# Patient Record
Sex: FEMALE | Race: BLACK OR AFRICAN AMERICAN | NOT HISPANIC OR LATINO | Employment: UNEMPLOYED | URBAN - METROPOLITAN AREA
[De-identification: names, ages, dates, MRNs, and addresses within clinical notes are randomized per-mention and may not be internally consistent; named-entity substitution may affect disease eponyms.]

---

## 2020-07-28 ENCOUNTER — HISTORICAL (OUTPATIENT)
Dept: ADMINISTRATIVE | Facility: HOSPITAL | Age: 30
End: 2020-07-28

## 2020-07-28 LAB
ABS NEUT (OLG): 4.86 X10(3)/MCL (ref 2.1–9.2)
ALBUMIN SERPL-MCNC: 3.6 GM/DL (ref 3.5–5)
ALBUMIN/GLOB SERPL: 1.1 RATIO (ref 1.1–2)
ALP SERPL-CCNC: 125 UNIT/L (ref 40–150)
ALT SERPL-CCNC: 16 UNIT/L (ref 0–55)
AMYLASE SERPL-CCNC: 42 UNIT/L (ref 25–125)
AST SERPL-CCNC: 14 UNIT/L (ref 5–34)
BASOPHILS # BLD AUTO: 0.1 X10(3)/MCL (ref 0–0.2)
BASOPHILS NFR BLD AUTO: 1 %
BILIRUB SERPL-MCNC: 0.3 MG/DL
BILIRUBIN DIRECT+TOT PNL SERPL-MCNC: 0.1 MG/DL (ref 0–0.8)
BILIRUBIN DIRECT+TOT PNL SERPL-MCNC: 0.2 MG/DL (ref 0–0.5)
BUN SERPL-MCNC: 11.6 MG/DL (ref 7–18.7)
CALCIUM SERPL-MCNC: 9.5 MG/DL (ref 8.4–10.2)
CHLORIDE SERPL-SCNC: 106 MMOL/L (ref 98–107)
CO2 SERPL-SCNC: 26 MMOL/L (ref 22–29)
CREAT SERPL-MCNC: 0.81 MG/DL (ref 0.55–1.02)
EOSINOPHIL # BLD AUTO: 0.2 X10(3)/MCL (ref 0–0.9)
EOSINOPHIL NFR BLD AUTO: 2 %
ERYTHROCYTE [DISTWIDTH] IN BLOOD BY AUTOMATED COUNT: 12.5 % (ref 11.5–17)
GLOBULIN SER-MCNC: 3.2 GM/DL (ref 2.4–3.5)
GLUCOSE SERPL-MCNC: 147 MG/DL (ref 74–100)
HCT VFR BLD AUTO: 41 % (ref 37–47)
HGB BLD-MCNC: 12.9 GM/DL (ref 12–16)
HIV 1+2 AB+HIV1 P24 AG SERPL QL IA: NONREACTIVE
IMM GRANULOCYTES # BLD AUTO: 0 10*3/UL
IMM GRANULOCYTES NFR BLD AUTO: 0 %
LIPASE SERPL-CCNC: 21 U/L
LYMPHOCYTES # BLD AUTO: 2.8 X10(3)/MCL (ref 0.6–4.6)
LYMPHOCYTES NFR BLD AUTO: 32 %
MCH RBC QN AUTO: 27.3 PG (ref 27–31)
MCHC RBC AUTO-ENTMCNC: 31.5 GM/DL (ref 33–36)
MCV RBC AUTO: 86.7 FL (ref 80–94)
MONOCYTES # BLD AUTO: 0.7 X10(3)/MCL (ref 0.1–1.3)
MONOCYTES NFR BLD AUTO: 8 %
NEUTROPHILS # BLD AUTO: 4.86 X10(3)/MCL (ref 2.1–9.2)
NEUTROPHILS NFR BLD AUTO: 56 %
PLATELET # BLD AUTO: 151 X10(3)/MCL (ref 130–400)
PMV BLD AUTO: 11.9 FL (ref 9.4–12.4)
POTASSIUM SERPL-SCNC: 3.9 MMOL/L (ref 3.5–5.1)
PROT SERPL-MCNC: 6.8 GM/DL (ref 6.4–8.3)
RBC # BLD AUTO: 4.73 X10(6)/MCL (ref 4.2–5.4)
SODIUM SERPL-SCNC: 141 MMOL/L (ref 136–145)
TSH SERPL-ACNC: 0.62 UIU/ML (ref 0.35–4.94)
WBC # SPEC AUTO: 8.7 X10(3)/MCL (ref 4.5–11.5)

## 2020-09-16 ENCOUNTER — HISTORICAL (OUTPATIENT)
Dept: ENDOSCOPY | Facility: HOSPITAL | Age: 30
End: 2020-09-16

## 2021-08-25 ENCOUNTER — HISTORICAL (OUTPATIENT)
Dept: ENDOSCOPY | Facility: HOSPITAL | Age: 31
End: 2021-08-25

## 2022-01-27 ENCOUNTER — HISTORICAL (OUTPATIENT)
Dept: ENDOSCOPY | Facility: HOSPITAL | Age: 32
End: 2022-01-27

## 2022-01-27 LAB — CBG: 281 (ref 70–115)

## 2022-03-23 ENCOUNTER — HISTORICAL (OUTPATIENT)
Dept: ADMINISTRATIVE | Facility: HOSPITAL | Age: 32
End: 2022-03-23

## 2022-03-23 LAB — POC CREATININE: 0.7 (ref 0.6–1.3)

## 2022-05-04 NOTE — HISTORICAL [facility]
This is a historical note converted from [employer]. Formatting and pictures may have been removed.  Please reference [employer] for original formatting and attached multimedia. Chief Complaint  Here for outpatient EGD  History of Present Illness  30 year old  female here for outpatient EGD.? See prior clinic note for further details.  Review of Systems  Constitutional:?no fever, fatigue  Eye:?no vision loss, eye redness, drainage, or pain  ENMT:?no sore throat, ear pain, sinus pain/congestion, nasal congestion/drainage  Respiratory:?no cough, no shortness of breath  Cardiovascular:?no chest pain, no palpitations  Gastrointestinal:?no nausea, vomiting, or diarrhea. No abdominal pain  Genitourinary:?no dysuria, no urinary frequency  Hema/Lymph:?no abnormal bruising or bleeding  Endocrine:?no heat or cold intolerance  Musculoskeletal:?no muscle or joint pain, no joint swelling  Integumentary:?no skin rash  Neurologic: no headache  Physical Exam  Vitals & Measurements  WT:?131.54?kg? BMI:?49.79?  General:?obese female in no acute distress  Eye: EOMI, clear conjunctiva, eyelids normal  HENT:?TMs/ear canals clear, oropharynx without erythema/exudate  Neck: full range of motion  Respiratory:?clear to auscultation bilaterally  Cardiovascular:?regular rate and rhythm without murmurs  Gastrointestinal:?soft, non-tender, non-distended with normal bowel sounds  Integumentary: no rashes  Neurologic: cranial nerves intact, grossly  Assessment/Plan  Discussed the risks and benefits of the procedure in detail, including but not limited to bleeding, perforation, infection, missed cancer and rarely Death.? Patient understands and wishes to proceed.?   Problem List/Past Medical History  Ongoing  Afib  Depression (emotion)  Diabetes mellitus  DM (diabetes mellitus)  Gastroparesis  High blood pressure disorder  HTN (hypertension)  Neuropathy  Schizophrenia  Tachycardia  TIA  Historical  Asthma  Diabetes mellitus type 2 in  obese  Essential hypertension  Gastric reflux  Procedure/Surgical History  Colonoscopy (12/28/2015)  Colonoscopy, flexible; with biopsy, single or multiple (12/28/2015)  Esophagogastroduodenoscopy (12/28/2015)  Esophagogastroduodenoscopy, flexible, transoral; with biopsy, single or multiple (12/28/2015)  Excision of Rectum, Via Natural or Artificial Opening Endoscopic, Diagnostic (12/28/2015)  Excision of Small Intestine, Via Natural or Artificial Opening Endoscopic, Diagnostic (12/28/2015)  cholecystectomy (09/09/2014)  esophageal flap reconstruction  Tonsillectomy   Medications  Inpatient  Buffered Lidocaine 1% - 1mL syringe, 5 mg= 0.5 mL, ID, As Directed, PRN  Pepcid (for IVPB), 40 mg= 4 mL, IV Piggyback, Once-NOW  Sodium Chloride 0.9% intravenous solution 1,000 mL, 1000 mL, IV  Home  desvenlafaxine 50 mg oral tablet, extended release, 50 mg= 1 tab(s), Oral, Daily  ferrous sulfate 324 mg (65 mg elemental iron) oral delayed release tablet, 324 mg= 1 tab(s), Oral, Daily  LORazepam 1 mg oral tablet, 1 mg= 1 tab(s), Oral, BID  metoclopramide 10 mg oral tablet, 10 mg= 1 tab(s), Oral, TID  montelukast 10 mg oral TABLET, 10 mg= 1 tab(s), Oral, Daily  ReliOn/NovoLIN R 100 units/mL injectable solution, Subcutaneous  Toujeo SoloStar 300 units/mL subcutaneous solution  Zofran ODT 4 mg oral tablet, disintegrating, 4 mg= 1 tab(s), Oral, q8hr, PRN  Allergies  Dilaudid?(Injection site burning, C/O: itching)  Geodon?(withdraw)  Januvia  Peanuts  ciprofloxacin  hydrochlorothiazide  penicillins?(vaginal burning)  sulfamethoxazole  Social History  Abuse/Neglect  No, 09/16/2020  No, 09/15/2020  No, 12/26/2019  Alcohol - Denies Alcohol Use, 08/18/2014  Never, 07/21/2015  Employment/School  Unemployed, 08/20/2015  Unemployed, 02/28/2015  Exercise  Exercise frequency: 3-4 times/week. Self assessment: Poor condition. Exercise type: Walking., 08/20/2015  Home/Environment  Lives with Mother., 04/11/2016  Lives with Father. Living  situation: Home/Independent. Home equipment: Glucose monitoring, life alert. Alcohol abuse in household: No. Substance abuse in household: No. Smoker in household: Yes. Feels unsafe at home: No. Safe place to go: Yes. Family/Friends available for support: Yes., 08/20/2015  Lives with Father., 03/17/2015  Nutrition/Health  Diabetic, Wants to lose weight: Yes. Sleeping concerns: Yes., 08/20/2015  Substance Use - Denies Substance Abuse, 08/18/2014  Never, 07/21/2015  Tobacco - Denies Tobacco Use, 08/18/2014  Never (less than 100 in lifetime), N/A, 09/16/2020  Never (less than 100 in lifetime), N/A, 09/15/2020  Never (less than 100 in lifetime), N/A, 12/26/2019  Never smoker, N/A, 12/18/2018  Family History  ANEMIA: Mother.  Glaucoma.: Father.  Hypertension.: Mother and Father.

## 2022-05-04 NOTE — HISTORICAL [facility]
This is a historical note converted from [employer]. Formatting and pictures may have been removed.  Please reference [employer] for original formatting and attached multimedia. Chief Complaint  here for outpatient colonoscopy  History of Present Illness  31 year old female here for outpatient colonoscopy given history of previous colon polyps.  Review of Systems  Constitutional:?no fever, fatigue  Eye:?no vision loss, eye redness, drainage, or pain  ENMT:?no sore throat, ear pain, sinus pain/congestion, nasal congestion/drainage  Respiratory:?no cough, no shortness of breath  Cardiovascular:?no chest pain, no palpitations  Gastrointestinal:?no nausea, vomiting, or diarrhea. No abdominal pain  Genitourinary:?no dysuria, no urinary frequency  Hema/Lymph:?no abnormal bruising or bleeding  Endocrine:?no heat or cold intolerance  Musculoskeletal:?no muscle or joint pain, no joint swelling  Integumentary:?no skin rash  Neurologic: no headache  Physical Exam  Vitals & Measurements  WT:?139.5?kg? BMI:?52.83?  General:?well-developed well-nourished in no acute distress  Eye: EOMI, clear conjunctiva, eyelids normal  HENT:?TMs/ear canals clear, oropharynx without erythema/exudate  Neck: full range of motion  Respiratory:?clear to auscultation bilaterally  Cardiovascular:?regular rate and rhythm without murmurs  Gastrointestinal:?soft, non-tender, non-distended with normal bowel sounds  Integumentary: no rashes  Neurologic: cranial nerves intact, grossly  Assessment/Plan  Discussed the risks and benefits of the procedure in detail, including but not limited to bleeding, perforation, infection, missed polyps and cancer and rarely Death.? Patient understands and wishes to proceed.?   Problem List/Past Medical History  Ongoing  Afib  Asthma  Depression (emotion)  Diabetes mellitus  Diabetes mellitus type 2 in obese  DM (diabetes mellitus)  Essential hypertension  Gastroparesis  High blood pressure disorder  HTN  (hypertension)  Neuropathy  Schizophrenia  Tachycardia  TIA  Historical  Gastric reflux  Procedure/Surgical History  Biopsy Gastrointestinal (09/16/2020)  Esophagogastroduodenoscopy (09/16/2020)  Esophagogastroduodenoscopy, flexible, transoral; with biopsy, single or multiple (09/16/2020)  Excision of Stomach, Via Natural or Artificial Opening Endoscopic, Diagnostic (09/16/2020)  Colonoscopy (12/28/2015)  Colonoscopy, flexible; with biopsy, single or multiple (12/28/2015)  Esophagogastroduodenoscopy (12/28/2015)  Esophagogastroduodenoscopy, flexible, transoral; with biopsy, single or multiple (12/28/2015)  Excision of Rectum, Via Natural or Artificial Opening Endoscopic, Diagnostic (12/28/2015)  Excision of Small Intestine, Via Natural or Artificial Opening Endoscopic, Diagnostic (12/28/2015)  cholecystectomy (09/09/2014)  esophageal flap reconstruction  Tonsillectomy   Medications  Inpatient  Buffered Lidocaine 1% - 1mL syringe, 5 mg= 0.5 mL, ID, As Directed, PRN  Buffered Lidocaine 1% - 1mL syringe, 5 mg= 0.5 mL, ID, As Directed, PRN  midazolam, 2 mg= 2 mL, IV Push, q5min  Pepcid (for IVPB), 40 mg= 4 mL, IV Piggyback, Once-NOW  Plasmalyte 1,000 mL, 1000 mL, IV  prednisONE 20 mg oral tablet, 40 mg= 4 tab(s), Oral, Once  Sodium Chloride 0.9% intravenous solution 500 mL, 500 mL, IV  Home  aspirin 81 mg oral Delayed Release (EC) tablet, 81 mg= 1 tab(s), Oral, Daily  atorvastatin 10 mg oral tablet, 10 mg= 1 tab(s), Oral, Daily  Breo Ellipta 200 mcg-25 mcg/inh inhalation powder, 1 puff(s), Oral, Daily  bumetanide 2 mg oral tablet, 2 mg= 1 tab(s), Oral, Daily  cholecalciferol 50,000 intl units (1250 mcg) oral capsule, 32374 IntUnit= 1 cap(s), Oral, qWeek  desvenlafaxine 100 mg oral tablet, extended release, 100 mg= 1 tab(s), Oral, Daily  desvenlafaxine 50 mg oral tablet, extended release, 50 mg= 1 tab(s), Oral, Daily,? ?Not taking  DIAZepam 5 mg oral tablet, 5 mg= 1 tab(s), Oral, TID  famotidine 40 mg oral tablet, 40 mg=  1 tab(s), Oral, qPM  ferrous sulfate 324 mg (65 mg elemental iron) oral delayed release tablet, 324 mg= 1 tab(s), Oral, Daily  fluticasone 50 mcg/inh nasal spray, 2 spray(s), Both Nostrils, Daily  glipiZIDE 10 mg oral tablet, extended release (XL tab), 10 mg= 1 tab(s), Oral, Daily  ibuprofen 800 mg oral tablet, 800 mg= 1 tab(s), Oral, q6hr,? ?Not taking  isosorbide MONOnitrate 10 mg oral tablet (Immed. Release), 10 mg= 1 tab(s), Oral, BID  levalbuterol 0.63 mg/3 mL inhalation solution, 0.63 mg= 3 mL, Oral, TID  loratadine 10 mg oral tablet, 10 mg= 1 tab(s), Oral, Daily  LORazepam 1 mg oral tablet, 1 mg= 1 tab(s), Oral, BID,? ?Not taking  LORazepam 2 mg oral tablet, 2 mg= 1 tab(s), Oral, TID,? ?Not taking  medroxyPROGESTERone 10 mg oral tablet, 10 mg= 1 tab(s), Oral, TID  metoclopramide 10 mg oral tablet, 10 mg= 1 tab(s), Oral, TID,? ?Not taking  metoprolol succinate 25 mg oral tablet, extended release, 25 mg= 1 tab(s), Oral, QID  metoprolol tartrate 25 mg oral tab, 12.5 mg= 0.5 tab(s), Oral, Daily,? ?Not taking  montelukast 10 mg oral TABLET, 10 mg= 1 tab(s), Oral, Daily  norethindrone 0.35 mg oral tablet, 0.35 mg= 1 tab(s), Oral, Daily,? ?Not taking  norethindrone 5 mg oral tablet, 5 mg= 1 tab(s), Oral, BID  NovoLOG FlexPen 100 units/mL injectable solution, 110 units= 110 units, Subcutaneous, Daily  Nurtec ODT 75 mg oral tablet, disintegrating, 75 mg= 1 tab(s), Oral, Daily  omeprazole 40 mg oral DR capsule, 40 mg= 1 cap(s), Oral, Daily,? ?Not taking  Pantoprazole 40 mg ORAL EC-Tablet, 40 mg= 1 tab(s), Oral, Daily  potassium chloride 8 mEq oral TABLET extended release, 8 mEq= 1 tab(s), Oral, Daily  pregabalin 50 mg oral capsule, 50 mg= 1 cap(s), Oral, BID  ReliOn/NovoLIN R 100 units/mL injectable solution, Subcutaneous  Rybelsus 7 mg oral tablet, 7 mg= 1 tab(s), Oral, Daily  Toujeo SoloStar 300 units/mL subcutaneous solution  traMADol 50 mg oral tablet, 50 mg= 1 tab(s), Oral, q12hr,? ?Not taking  Zofran ODT 4 mg  oral tablet, disintegrating, 4 mg= 1 tab(s), Oral, q8hr, PRN,? ?Not taking  Allergies  Dilaudid?(Injection site burning, C/O: itching)  Geodon?(withdraw)  Januvia  Peanuts  ciprofloxacin  hydrochlorothiazide  penicillins?(vaginal burning)  sulfamethoxazole  Social History  Abuse/Neglect  No, No, Yes, 08/25/2021  No, 04/25/2021  No, 09/16/2020  No, 09/15/2020  No, 12/26/2019  Alcohol - Denies Alcohol Use, 08/18/2014  Never, 07/21/2015  Employment/School  Unemployed, 08/20/2015  Unemployed, 02/28/2015  Exercise  Exercise frequency: 3-4 times/week. Self assessment: Poor condition. Exercise type: Walking., 08/20/2015  Home/Environment  Lives with Mother., 04/11/2016  Lives with Father. Living situation: Home/Independent. Home equipment: Glucose monitoring, life alert. Alcohol abuse in household: No. Substance abuse in household: No. Smoker in household: Yes. Feels unsafe at home: No. Safe place to go: Yes. Family/Friends available for support: Yes., 08/20/2015  Lives with Father., 03/17/2015  Nutrition/Health  Diabetic, Wants to lose weight: Yes. Sleeping concerns: Yes., 08/20/2015  Substance Use - Denies Substance Abuse, 08/18/2014  Never, 07/21/2015  Tobacco - Denies Tobacco Use, 08/18/2014  Never (less than 100 in lifetime), No, 08/25/2021  Never (less than 100 in lifetime), N/A, 04/25/2021  Never (less than 100 in lifetime), N/A, 09/16/2020  Never (less than 100 in lifetime), N/A, 09/15/2020  Never (less than 100 in lifetime), N/A, 12/26/2019  Never smoker, N/A, 12/18/2018  Family History  ANEMIA: Mother.  Glaucoma.: Father.  Hypertension.: Mother and Father.

## 2022-05-21 NOTE — HISTORICAL [facility]
This is a historical note converted from [employer]. Formatting and pictures may have been removed.  Please reference [employer] for original formatting and attached multimedia. Chief Complaint  Here for outpatient EGD/COLONOSCOPY  History of Present Illness  31 year old  female here for EGD/colonoscopy, see most recent clinic note for full details.  Review of Systems  Constitutional:?no fever, fatigue  Eye:?no vision loss  ENMT:?no sore throat  Respiratory:?no cough, no shortness of breath  Cardiovascular:?no chest pain, no palpitations  Gastrointestinal:?+ epigastric pain  Genitourinary:?no dysuria, no urinary frequency  Hema/Lymph:?no abnormal bruising or bleeding  Endocrine:?no heat or cold intolerance  Musculoskeletal:?no muscle or joint pain, no joint swelling  Integumentary:?no skin rash  Neurologic: no headache  Physical Exam  Vitals & Measurements  T:?36.9? ?C (Temporal Artery)? HR:?109(Monitored)? RR:?27? BP:?116/75? SpO2:?100%? WT:?138.79?kg? BMI:?52.52?  General:?well-developed well-nourished in no acute distress  Eye: EOMI, clear conjunctiva, eyelids normal  HENT:?TMs/ear canals clear, oropharynx without erythema/exudate  Neck: full range of motion  Respiratory:?clear to auscultation bilaterally  Cardiovascular:?regular rate and rhythm without murmurs  Gastrointestinal:?soft, non-tender, non-distended with normal bowel sounds  Integumentary: no rashes  Neurologic: cranial nerves intact, grossly  Assessment/Plan  Discussed the risks and benefits of the procedure in detail, including but not limited to bleeding, perforation, infection, missed polyps and cancer and rarely Death.? Patient understands and wishes to proceed.?   Problem List/Past Medical History  Ongoing  Afib  Asthma  Depression (emotion)  Diabetes mellitus  Diabetes mellitus type 2 in obese  DM (diabetes mellitus)  Essential hypertension  Gastroparesis  High blood pressure disorder  HTN  (hypertension)  Neuropathy  Schizophrenia  Tachycardia  TIA  Historical  Gastric reflux  Procedure/Surgical History  Colonoscopy (None) (08/25/2021)  Colonoscopy, flexible; diagnostic, including collection of specimen(s) by brushing or washing, when performed (separate procedure) (08/25/2021)  Inspection of Lower Intestinal Tract, Via Natural or Artificial Opening Endoscopic (08/25/2021)  Biopsy Gastrointestinal (09/16/2020)  Esophagogastroduodenoscopy (09/16/2020)  Esophagogastroduodenoscopy, flexible, transoral; with biopsy, single or multiple (09/16/2020)  Excision of Stomach, Via Natural or Artificial Opening Endoscopic, Diagnostic (09/16/2020)  Colonoscopy (12/28/2015)  Colonoscopy, flexible; with biopsy, single or multiple (12/28/2015)  Esophagogastroduodenoscopy (12/28/2015)  Esophagogastroduodenoscopy, flexible, transoral; with biopsy, single or multiple (12/28/2015)  Excision of Rectum, Via Natural or Artificial Opening Endoscopic, Diagnostic (12/28/2015)  Excision of Small Intestine, Via Natural or Artificial Opening Endoscopic, Diagnostic (12/28/2015)  cholecystectomy (09/09/2014)  esophageal flap reconstruction  Tonsillectomy   Medications  Inpatient  Buffered Lidocaine 1% - 1mL syringe, 5 mg= 0.5 mL, ID, As Directed, PRN  Pepcid (for IVPB), 40 mg= 4 mL, IV Piggyback, Once-NOW  Plasmalyte 1,000 mL, 1000 mL, IV  prednisONE 20 mg oral tablet, 40 mg= 4 tab(s), Oral, Once  Home  aspirin 81 mg oral Delayed Release (EC) tablet, 81 mg= 1 tab(s), Oral, Daily  atorvastatin 10 mg oral tablet, 10 mg= 1 tab(s), Oral, Daily  Breo Ellipta 200 mcg-25 mcg/inh inhalation powder, 1 puff(s), Oral, Daily  bumetanide 2 mg oral tablet, 2 mg= 1 tab(s), Oral, Daily  cholecalciferol 50,000 intl units (1250 mcg) oral capsule, 73654 IntUnit= 1 cap(s), Oral, qWeek  desvenlafaxine 100 mg oral tablet, extended release, 100 mg= 1 tab(s), Oral, Daily  desvenlafaxine 50 mg oral tablet, extended release, 50 mg= 1 tab(s), Oral, Daily,?  ?Not taking  DIAZepam 5 mg oral tablet, 5 mg= 1 tab(s), Oral, TID  famotidine 40 mg oral tablet, 40 mg= 1 tab(s), Oral, qPM  ferrous sulfate 324 mg (65 mg elemental iron) oral delayed release tablet, 324 mg= 1 tab(s), Oral, Daily  fluticasone 50 mcg/inh nasal spray, 2 spray(s), Both Nostrils, Daily  glipiZIDE 10 mg oral tablet, extended release (XL tab), 10 mg= 1 tab(s), Oral, Daily  ibuprofen 800 mg oral tablet, 800 mg= 1 tab(s), Oral, q6hr,? ?Not taking  isosorbide MONOnitrate 10 mg oral tablet (Immed. Release), 10 mg= 1 tab(s), Oral, BID  levalbuterol 0.63 mg/3 mL inhalation solution, 0.63 mg= 3 mL, Oral, TID  loratadine 10 mg oral tablet, 10 mg= 1 tab(s), Oral, Daily  LORazepam 1 mg oral tablet, 1 mg= 1 tab(s), Oral, BID,? ?Not taking  LORazepam 2 mg oral tablet, 2 mg= 1 tab(s), Oral, TID,? ?Not taking  medroxyPROGESTERone 10 mg oral tablet, 10 mg= 1 tab(s), Oral, TID  metoclopramide 10 mg oral tablet, 10 mg= 1 tab(s), Oral, TID,? ?Not taking  metoprolol succinate 25 mg oral tablet, extended release, 25 mg= 1 tab(s), Oral, QID  metoprolol tartrate 25 mg oral tab, 12.5 mg= 0.5 tab(s), Oral, Daily,? ?Not taking  montelukast 10 mg oral TABLET, 10 mg= 1 tab(s), Oral, Daily  norethindrone 0.35 mg oral tablet, 0.35 mg= 1 tab(s), Oral, Daily,? ?Not taking  norethindrone 5 mg oral tablet, 5 mg= 1 tab(s), Oral, BID  NovoLOG FlexPen 100 units/mL injectable solution, 110 units= 110 units, Subcutaneous, Daily  Nurtec ODT 75 mg oral tablet, disintegrating, 75 mg= 1 tab(s), Oral, Daily  omeprazole 40 mg oral DR capsule, 40 mg= 1 cap(s), Oral, Daily,? ?Not taking  Pantoprazole 40 mg ORAL EC-Tablet, 40 mg= 1 tab(s), Oral, Daily  potassium chloride 8 mEq oral TABLET extended release, 8 mEq= 1 tab(s), Oral, Daily  pregabalin 50 mg oral capsule, 50 mg= 1 cap(s), Oral, BID  ReliOn/NovoLIN R 100 units/mL injectable solution, Subcutaneous  Rybelsus 7 mg oral tablet, 7 mg= 1 tab(s), Oral, Daily  Toujeo SoloStar 300 units/mL  subcutaneous solution  traMADol 50 mg oral tablet, 50 mg= 1 tab(s), Oral, q12hr,? ?Not taking  Zofran ODT 4 mg oral tablet, disintegrating, 4 mg= 1 tab(s), Oral, q8hr, PRN,? ?Not taking  Allergies  Dilaudid?(Injection site burning, C/O: itching)  Geodon?(withdraw)  Januvia  Peanuts  ciprofloxacin  hydrochlorothiazide  penicillins?(vaginal burning)  sulfamethoxazole  Social History  Abuse/Neglect  No, No, Yes, 01/27/2022  No, 09/17/2021  No, No, Yes, 08/25/2021  No, 04/25/2021  No, 09/16/2020  No, 09/15/2020  Alcohol - Denies Alcohol Use, 08/18/2014  Never, 07/21/2015  Employment/School  Unemployed, 08/20/2015  Unemployed, 02/28/2015  Exercise  Exercise frequency: 3-4 times/week. Self assessment: Poor condition. Exercise type: Walking., 08/20/2015  Home/Environment  Lives with Mother., 04/11/2016  Lives with Father. Living situation: Home/Independent. Home equipment: Glucose monitoring, life alert. Alcohol abuse in household: No. Substance abuse in household: No. Smoker in household: Yes. Feels unsafe at home: No. Safe place to go: Yes. Family/Friends available for support: Yes., 08/20/2015  Lives with Father., 03/17/2015  Nutrition/Health  Diabetic, Wants to lose weight: Yes. Sleeping concerns: Yes., 08/20/2015  Substance Use - Denies Substance Abuse, 08/18/2014  Never, 07/21/2015  Tobacco - Denies Tobacco Use, 08/18/2014  Never (less than 100 in lifetime), N/A, 01/27/2022  5-9 cigarettes (between 1/4 to 1/2 pack)/day in last 30 days, No, 01/26/2022  Never (less than 100 in lifetime), No, 09/17/2021  Never (less than 100 in lifetime), No, 08/25/2021  Never (less than 100 in lifetime), N/A, 04/25/2021  Never (less than 100 in lifetime), N/A, 09/16/2020  Never (less than 100 in lifetime), N/A, 09/15/2020  Never (less than 100 in lifetime), N/A, 12/26/2019  Never smoker, N/A, 12/18/2018  Family History  ANEMIA: Mother.  Glaucoma.: Father.  Hypertension.: Mother and Father.

## 2022-07-24 ENCOUNTER — HOSPITAL ENCOUNTER (OUTPATIENT)
Dept: RADIOLOGY | Facility: HOSPITAL | Age: 32
Discharge: HOME | End: 2022-07-24
Attending: NURSE PRACTITIONER
Payer: COMMERCIAL

## 2022-07-24 ENCOUNTER — OFFICE VISIT (OUTPATIENT)
Dept: URGENT CARE | Facility: CLINIC | Age: 32
End: 2022-07-24
Payer: COMMERCIAL

## 2022-07-24 VITALS
DIASTOLIC BLOOD PRESSURE: 64 MMHG | BODY MASS INDEX: 50.02 KG/M2 | WEIGHT: 293 LBS | SYSTOLIC BLOOD PRESSURE: 106 MMHG | RESPIRATION RATE: 18 BRPM | HEIGHT: 64 IN

## 2022-07-24 DIAGNOSIS — M23.91 INTERNAL DERANGEMENT OF RIGHT KNEE: ICD-10-CM

## 2022-07-24 DIAGNOSIS — E66.9 OBESITY, UNSPECIFIED CLASSIFICATION, UNSPECIFIED OBESITY TYPE, UNSPECIFIED WHETHER SERIOUS COMORBIDITY PRESENT: ICD-10-CM

## 2022-07-24 DIAGNOSIS — M25.561 ACUTE PAIN OF RIGHT KNEE: Primary | ICD-10-CM

## 2022-07-24 PROCEDURE — 99204 OFFICE O/P NEW MOD 45 MIN: CPT | Mod: PBBFAC | Performed by: NURSE PRACTITIONER

## 2022-07-24 PROCEDURE — 99213 OFFICE O/P EST LOW 20 MIN: CPT | Mod: S$PBB,,, | Performed by: NURSE PRACTITIONER

## 2022-07-24 PROCEDURE — 73564 X-RAY EXAM KNEE 4 OR MORE: CPT | Mod: TC,RT

## 2022-07-24 PROCEDURE — 99213 PR OFFICE/OUTPT VISIT, EST, LEVL III, 20-29 MIN: ICD-10-PCS | Mod: S$PBB,,, | Performed by: NURSE PRACTITIONER

## 2022-07-24 PROCEDURE — 63600175 PHARM REV CODE 636 W HCPCS: Performed by: NURSE PRACTITIONER

## 2022-07-24 RX ORDER — DICLOFENAC SODIUM 75 MG/1
75 TABLET, DELAYED RELEASE ORAL 2 TIMES DAILY
Qty: 60 TABLET | Refills: 0 | Status: SHIPPED | OUTPATIENT
Start: 2022-07-24 | End: 2022-08-23

## 2022-07-24 RX ORDER — KETOROLAC TROMETHAMINE 30 MG/ML
60 INJECTION, SOLUTION INTRAMUSCULAR; INTRAVENOUS
Status: COMPLETED | OUTPATIENT
Start: 2022-07-24 | End: 2022-07-24

## 2022-07-24 RX ORDER — METHOCARBAMOL 500 MG/1
500 TABLET, FILM COATED ORAL 4 TIMES DAILY PRN
Qty: 30 TABLET | Refills: 0 | Status: SHIPPED | OUTPATIENT
Start: 2022-07-24 | End: 2022-08-03

## 2022-07-24 RX ADMIN — KETOROLAC TROMETHAMINE 60 MG: 60 INJECTION, SOLUTION INTRAMUSCULAR at 06:07

## 2022-07-24 NOTE — PROGRESS NOTES
"Subjective:       Patient ID: Katelyn Miller is a 32 y.o. female.    Vitals:  height is 5' 4" (1.626 m) and weight is 137 kg (302 lb) (abnormal). Her oral temperature is 98.9 °F (37.2 °C) (pended). Her blood pressure is 106/64 and her pulse is 111 (abnormal, pended). Her respiration is 18 and oxygen saturation is 100% (pended).     Chief Complaint: Leg Pain (right)    Patient is a 32-year-old female, here today for right knee pain that has been going on over a month.  Patient states she was seen by her regular doctor in [place], was told she had wear and tear on knee.  Rates pain 8/10.  Previously was taken Toradol p.o. but states she was told by her doctor not take it for too long.  Denies any new fall or trauma.      Cardiovascular: Negative.    Respiratory: Negative.    Musculoskeletal: Positive for pain.       Objective:      Physical Exam   Constitutional: She is oriented to person, place, and time. She appears well-developed.   HENT:   Head: Normocephalic.   Eyes: Conjunctivae and EOM are normal. Pupils are equal, round, and reactive to light.   Neck: Neck supple.   Cardiovascular: Normal rate, regular rhythm and normal heart sounds.   Pulmonary/Chest: Effort normal and breath sounds normal.   Musculoskeletal: Normal range of motion.         General: Normal range of motion.        Legs:    Neurological: She is alert and oriented to person, place, and time.   Skin: Skin is warm and dry.   Psychiatric: Her behavior is normal.   Vitals reviewed.        Assessment:       1. Acute pain of right knee    2. Obesity, unspecified classification, unspecified obesity type, unspecified whether serious comorbidity present    3. Internal derangement of right knee            No visits with results within 1 Day(s) from this visit.   Latest known visit with results is:   No results found for any previous visit.        No results found.   Plan:       Toradol 60 mg IM in office.   Medication as ordered, do not combine NSAIDs.  " Hot or cold therapy.  Active range of motion exercises. F/u with pcp. ER precautions.   XR R knee pending, will notify of abnormal results.   Acute pain of right knee  -     X-Ray Knee Complete 4 Or More Views Right  -     ketorolac injection 60 mg  -     diclofenac (VOLTAREN) 75 MG EC tablet; Take 1 tablet (75 mg total) by mouth 2 (two) times daily.  Dispense: 60 tablet; Refill: 0  -     methocarbamoL (ROBAXIN) 500 MG Tab; Take 1 tablet (500 mg total) by mouth 4 (four) times daily as needed (pain). As needed for muscle spasm. May cause drowsiness  Dispense: 30 tablet; Refill: 0  -     Ambulatory referral/consult to Orthopedics    Obesity, unspecified classification, unspecified obesity type, unspecified whether serious comorbidity present    Internal derangement of right knee  -     Ambulatory referral/consult to Orthopedics

## 2022-08-04 ENCOUNTER — OFFICE VISIT (OUTPATIENT)
Dept: ORTHOPEDICS | Facility: CLINIC | Age: 32
End: 2022-08-04
Payer: COMMERCIAL

## 2022-08-04 VITALS
SYSTOLIC BLOOD PRESSURE: 160 MMHG | DIASTOLIC BLOOD PRESSURE: 64 MMHG | BODY MASS INDEX: 50.02 KG/M2 | WEIGHT: 293 LBS | HEIGHT: 64 IN

## 2022-08-04 DIAGNOSIS — S83.231A COMPLEX TEAR OF MEDIAL MENISCUS OF RIGHT KNEE, UNSPECIFIED WHETHER OLD OR CURRENT TEAR, INITIAL ENCOUNTER: Primary | ICD-10-CM

## 2022-08-04 PROCEDURE — 99204 PR OFFICE/OUTPT VISIT, NEW, LEVL IV, 45-59 MIN: ICD-10-PCS | Mod: 25,,, | Performed by: ORTHOPAEDIC SURGERY

## 2022-08-04 PROCEDURE — 1160F RVW MEDS BY RX/DR IN RCRD: CPT | Mod: CPTII,,, | Performed by: ORTHOPAEDIC SURGERY

## 2022-08-04 PROCEDURE — 3077F SYST BP >= 140 MM HG: CPT | Mod: CPTII,,, | Performed by: ORTHOPAEDIC SURGERY

## 2022-08-04 PROCEDURE — 3078F DIAST BP <80 MM HG: CPT | Mod: CPTII,,, | Performed by: ORTHOPAEDIC SURGERY

## 2022-08-04 PROCEDURE — 20610 DRAIN/INJ JOINT/BURSA W/O US: CPT | Mod: RT,,, | Performed by: ORTHOPAEDIC SURGERY

## 2022-08-04 PROCEDURE — 1159F PR MEDICATION LIST DOCUMENTED IN MEDICAL RECORD: ICD-10-PCS | Mod: CPTII,,, | Performed by: ORTHOPAEDIC SURGERY

## 2022-08-04 PROCEDURE — 99204 OFFICE O/P NEW MOD 45 MIN: CPT | Mod: 25,,, | Performed by: ORTHOPAEDIC SURGERY

## 2022-08-04 PROCEDURE — 1160F PR REVIEW ALL MEDS BY PRESCRIBER/CLIN PHARMACIST DOCUMENTED: ICD-10-PCS | Mod: CPTII,,, | Performed by: ORTHOPAEDIC SURGERY

## 2022-08-04 PROCEDURE — 3008F BODY MASS INDEX DOCD: CPT | Mod: CPTII,,, | Performed by: ORTHOPAEDIC SURGERY

## 2022-08-04 PROCEDURE — 3077F PR MOST RECENT SYSTOLIC BLOOD PRESSURE >= 140 MM HG: ICD-10-PCS | Mod: CPTII,,, | Performed by: ORTHOPAEDIC SURGERY

## 2022-08-04 PROCEDURE — 3008F PR BODY MASS INDEX (BMI) DOCUMENTED: ICD-10-PCS | Mod: CPTII,,, | Performed by: ORTHOPAEDIC SURGERY

## 2022-08-04 PROCEDURE — 3078F PR MOST RECENT DIASTOLIC BLOOD PRESSURE < 80 MM HG: ICD-10-PCS | Mod: CPTII,,, | Performed by: ORTHOPAEDIC SURGERY

## 2022-08-04 PROCEDURE — 20610 LARGE JOINT ASPIRATION/INJECTION: R KNEE: ICD-10-PCS | Mod: RT,,, | Performed by: ORTHOPAEDIC SURGERY

## 2022-08-04 PROCEDURE — 1159F MED LIST DOCD IN RCRD: CPT | Mod: CPTII,,, | Performed by: ORTHOPAEDIC SURGERY

## 2022-08-04 RX ORDER — BETAMETHASONE SODIUM PHOSPHATE AND BETAMETHASONE ACETATE 3; 3 MG/ML; MG/ML
6 INJECTION, SUSPENSION INTRA-ARTICULAR; INTRALESIONAL; INTRAMUSCULAR; SOFT TISSUE
Status: DISCONTINUED | OUTPATIENT
Start: 2022-08-04 | End: 2022-08-04 | Stop reason: HOSPADM

## 2022-08-04 RX ORDER — LIDOCAINE HYDROCHLORIDE 20 MG/ML
2 INJECTION, SOLUTION INFILTRATION; PERINEURAL
Status: DISCONTINUED | OUTPATIENT
Start: 2022-08-04 | End: 2022-08-04 | Stop reason: HOSPADM

## 2022-08-04 RX ADMIN — BETAMETHASONE SODIUM PHOSPHATE AND BETAMETHASONE ACETATE 6 MG: 3; 3 INJECTION, SUSPENSION INTRA-ARTICULAR; INTRALESIONAL; INTRAMUSCULAR; SOFT TISSUE at 01:08

## 2022-08-04 RX ADMIN — LIDOCAINE HYDROCHLORIDE 2 ML: 20 INJECTION, SOLUTION INFILTRATION; PERINEURAL at 01:08

## 2022-08-04 NOTE — PROGRESS NOTES
"History of present illness:    This is a 32 y.o. year old female comes in with knee pain.  Patient states the pain is associated and worsened with activity.  Patient complains of medial sided knee pain.  Patient reports periodic swelling and locking and catching.  Patient reports pain with deep knee flexion.  Patient states the pain is moderate.  Patient has not had any previous treatment.    Past Medical History:   Diagnosis Date    Asthma     Congenital heart disease     Diabetes mellitus type I     Hypertension     Respiratory distress     Stroke        Past Surgical History:   Procedure Laterality Date     SECTION      GALLBLADDER SURGERY      GERD surgery      TONSILLECTOMY         Current Outpatient Medications   Medication Sig    diclofenac (VOLTAREN) 75 MG EC tablet Take 1 tablet (75 mg total) by mouth 2 (two) times daily.     No current facility-administered medications for this visit.       Review of patient's allergies indicates:   Allergen Reactions    Ciprofloxacin     Hydrochlorothiazide     Hydromorphone Itching     Other reaction(s): Injection site burning  has had demerol previously with itching, alleviated with benadryl.    Peanut     Penicillins      Other reaction(s): vaginal burning    Sitagliptin     Sulfamethoxazole     Ziprasidone      Other reaction(s): "withdraw"       History reviewed. No pertinent family history.    Social History     Socioeconomic History    Marital status:    Tobacco Use    Smoking status: Never Smoker    Smokeless tobacco: Never Used   Social History Narrative    ** Merged History Encounter **            Chief Complaint:   Chief Complaint   Patient presents with    Right Knee - Pain    Pain     Right knee pain, patient states shes tried working out and doing PT nothing is working, pain has been off and on since last year had a mini stroke in 2018 on that side       Consulting Physician: Tanner Hurtado, [facility]      Review of " "Systems:    All review of systems negative except for those stated in the HPI    Examination:    Vital Signs:    Vitals:    08/04/22 1340   BP: (!) 160/64   Weight: (!) 137 kg (302 lb)   Height: 5' 4" (1.626 m)       Body mass index is 51.84 kg/m².    Physical Exam:   General: Well-developed, well-nourished.  Neuro: Alert and oriented x 3.  Psych: Normal mood and affect.  Knee Exam:    No obvious deformity. Range of motion from 3-110 degrees. Negative patella grind and equal subluxation of knee cap medial and lateral < 1cm. Negative patella tendon tenderness. Negative Lachman and anterior drawer test. Negative posterior drawer test. Negative varus and valgus stress test. Positive medial joint line tenderness. Positive Lupe's medial sided knee pain. Positive deep knee flexion been test with medial sided knee pain. Negative lateral joint line tenderness. 4-/5 strength and normal skin appearance. Sensibility normal.         Assessment: Complex tear of medial meniscus of right knee, unspecified whether old or current tear, initial encounter  -     Large Joint Aspiration/Injection: R knee        Plan:    I reviewed this patient's x-rays from an outside facility and has no signs of any acute osseous pathology.  Based on history and exam I believe she has a medial meniscus tear.  I will give her a particularly in intermediate patient today in so.  I will see her back in 6 weeks.  If she does not responded to this, we will plan for a MRI of her right knee.      Large Joint Aspiration/Injection: R knee    Date/Time: 8/4/2022 1:00 PM  Performed by: Ruben Mcgee MD  Authorized by: Ruben Mcgee MD     Consent Done?:  Yes (Verbal)  Indications:  Pain  Timeout: prior to procedure the correct patient, procedure, and site was verified    Prep: patient was prepped and draped in usual sterile fashion      Details:  Needle Size:  25 G  Approach:  Anterolateral  Location:  Knee  Site:  R knee  Medications:  2 mL LIDOcaine HCL 20 mg/ml (2%) " 20 mg/mL (2 %); 6 mg betamethasone acetate-betamethasone sodium phosphate 6 mg/mL  Patient tolerance:  Patient tolerated the procedure well with no immediate complications           DISCLAIMER: This note may have been dictated using voice recognition software and may contain grammatical errors.     NOTE: Consult report sent to referring provider via [employer] EMR.

## 2022-08-04 NOTE — PROCEDURES
Large Joint Aspiration/Injection: R knee    Date/Time: 8/4/2022 1:00 PM  Performed by: Ruben Mcgee MD  Authorized by: Ruben Mcgee MD     Consent Done?:  Yes (Verbal)  Indications:  Pain  Timeout: prior to procedure the correct patient, procedure, and site was verified    Prep: patient was prepped and draped in usual sterile fashion      Details:  Needle Size:  25 G  Approach:  Anterolateral  Location:  Knee  Site:  R knee  Medications:  2 mL LIDOcaine HCL 20 mg/ml (2%) 20 mg/mL (2 %); 6 mg betamethasone acetate-betamethasone sodium phosphate 6 mg/mL  Patient tolerance:  Patient tolerated the procedure well with no immediate complications

## 2022-08-16 DIAGNOSIS — S83.231A COMPLEX TEAR OF MEDIAL MENISCUS OF RIGHT KNEE, UNSPECIFIED WHETHER OLD OR CURRENT TEAR, INITIAL ENCOUNTER: Primary | ICD-10-CM

## 2022-08-19 ENCOUNTER — CLINICAL SUPPORT (OUTPATIENT)
Dept: REHABILITATION | Facility: HOSPITAL | Age: 32
End: 2022-08-19
Attending: ORTHOPAEDIC SURGERY
Payer: COMMERCIAL

## 2022-08-19 DIAGNOSIS — G89.29 CHRONIC PAIN OF RIGHT KNEE: ICD-10-CM

## 2022-08-19 DIAGNOSIS — S83.231A COMPLEX TEAR OF MEDIAL MENISCUS OF RIGHT KNEE, UNSPECIFIED WHETHER OLD OR CURRENT TEAR, INITIAL ENCOUNTER: ICD-10-CM

## 2022-08-19 DIAGNOSIS — R26.2 DIFFICULTY WALKING: ICD-10-CM

## 2022-08-19 DIAGNOSIS — M25.561 CHRONIC PAIN OF RIGHT KNEE: ICD-10-CM

## 2022-08-19 PROCEDURE — 97163 PT EVAL HIGH COMPLEX 45 MIN: CPT

## 2022-08-19 NOTE — PROGRESS NOTES
[facility]  Physical Therapy Initial Evaluation    Name: Katelyn Miller  Clinic Number: [id number]    Therapy Diagnosis:   Encounter Diagnoses   Name Primary?    Complex tear of medial meniscus of right knee, unspecified whether old or current tear, initial encounter     Chronic pain of right knee     Difficulty walking      Physician: Ruben Mcgee MD    Physician Orders: PT Eval and Treat  Medical Diagnosis from Referral: Complex tear of medial meniscus of the right knee  Evaluation Date: 8/19/2022  Authorization Period Expiration: 8/19/2022  Plan of Care Expiration: to be determined  Visit # / Visits authorized: to be determined    Time In: 1403  Time Out:1503  Total Appointment Time (timed & untimed codes): 60 minutes  Total Treatment time (time-based codes) separate from Evaluation: 0 minutes    Surgery: none  Orthopedic Precautions: none  Pertinent History: Hx of TIA    Subjective   Katelyn reports: right knee pain since Feb 2022 that has progressively worsened over time making ADL's and caring for her 2 y.o. son extremely difficult. Today she presents with 10/10 right knee pain described along the medial and posterior sides of her knee. She is unable to identify any one incident as a CARMINE though relates several instances of hurting her knee. Activity worsens her pain and states the knee swells especially by the end of the day when up on her feet. Her knee will catch and lock on occasions and there are times that it gives out. Denies any falls to this point. She is unable to stoop, squat, or kneel. She is  and lives with her mom and has to navigate 4-5 steps into the home and reports that this can be difficult for her especially when coming down the steps. She did see Dr. Ruben Mcgee who diagnosed her with a complex tear of medial meniscus. He aspirated the knee and gave her an injection. Patient reports this was very beneficial but only lasted 3 days. Since seeing Dr. Mcgee she  "lost her insurance and will not be able to follow up.    CARMINE: unaware     Reason for visit: Left knee pain  Onset time and any changes: 2022  Pain level and location: 10/10  Describe pain: constant aching with sharp pain at times with activity  Numbness/tingling: denies  Agg factors: increased activity  Ease factors: rest, heat, ice  Functional Limitations: unable to do much without hurting; struggles to take care of her 2 y.o.  Goals: decrease pain so she can function best with ADL's  Prior therapy/intervention: yes      Medical History:   Past Medical History:   Diagnosis Date    Asthma     Congenital heart disease     Diabetes mellitus type I     Hypertension     Respiratory distress     Stroke        Surgical History:   Katelyn Miller  has a past surgical history that includes Gallbladder surgery; Tonsillectomy; GERD surgery; and  section.    Medications:   Katelyn has a current medication list which includes the following prescription(s): diclofenac.    Allergies:   Review of patient's allergies indicates:   Allergen Reactions    Ciprofloxacin     Hydrochlorothiazide     Hydromorphone Itching     Other reaction(s): Injection site burning  has had demerol previously with itching, alleviated with benadryl.    Peanut     Penicillins      Other reaction(s): vaginal burning    Sitagliptin     Sulfamethoxazole     Ziprasidone      Other reaction(s): "withdraw"        Imaging, X-rays: revealed no osseous pathology  MRI pending post therapy      Outcome Measure:  Eval: Knee Outcome Survey  = 97.1% disability, 2.9% functional    Objective          Posture     Right -normal alignment,  with decreased weightbearing on the RLE     Left - normal alignment       Appearance     Right- mild effusion medial aspect, mild quadriceps atrophy, no erythema     Left - normal appearance     Palpation     Right - ttp along medial or lateral joint lines, posterior knee; this is no peripatellar tenderness, no " warmth     Left - no ttp    Gait     Mild antalgic gait pattern with mild weight avoidance on the right knee, mild decrease right stance phase      ROM     Right - 3-95 degrees; movement guarded     Left - 0-115 degrees      Strength       Right/Left   HIP FLX 4/5, 5/5  KNEE FLX 4/5, 5/5  KNEE EXT 4/5, 5/5  ANKLE DF 5/5, 5/5      Dermatomes        BLEs intact to light touch         Special Tests     Patellar Tracking  appears normal without patellar tenderness  Lachman's-negative  Anterior Drawer-negative  Posterior Drawer-negative  Lupe's-positive with medial pain  Apley's-positive with medial knee pain  Varus stress-negative  Valgus stress-negative  Patella Grind test-negative  Apprehension-negative         Hip/Lumbar Exam     normal range of motion     DEWAYNE; (negative) left, (negative) right; 90/90 hamstring test; left negative, right negative     Slump test - negative             TREATMENT     Katelyn received the treatments listed below:       Time Activities   Manual     TherAct     TherEx  HEP; quad sets, ham sets, heel slides, hip abd/add, bridging   Gait     Neuro Re-ed     Modalities     E-Stim     Dry Needling     Canalith Repositioning       Home Exercises and Patient Education Provided    - HEP, plan of care     Written Home Exercises Provided: yes.  Exercises were reviewed and Katelyn was able to demonstrate them prior to the end of the session. Gin demonstrated good understanding of the education provided.     Assessment   Katelyn is a 32 y.o. female referred to outpatient Physical Therapy with a medical diagnosis of Complex tear of medial meniscus of the right knee. Patient presents with mechanical right knee pain with weakness, decrease ROM with edema, altered gait pattern with weight bearing avoidance on the right, fear/sensation of knee buckling which all lead to functional debility and restricted activity. Suspect meniscus involvement based on clinical findings and subjective complaints.  Patient will benefit from skilled outpatient Physical Therapy to address the deficits stated above and in the chart below, provide education, and to maximize patient's level of independence.    Patient prognosis is fair; multiple comorbidities    Plan of care discussed with patient: Yes  Patient's spiritual, cultural and educational needs considered and patient is agreeable to the plan of care and goals as stated below:    Anticipated Barriers for therapy: none    Goals:  Short Term Goals: 6 weeks  1. Patient will report at least 10% disability reduction on KOS to indicate clinically significant functional improvement  2. Patient will demonstrate improved ability to navigate up and down the steps into her home without fear/sensation of buckling  3. Patient will demonstrate improved quality of gait with equal weightbearing each limb  4. Patient will be independent with HEP to help improve mechanics and increase functional capacity     Long Term Goals: 12 weeks   1. Patient will report at least 20% disability reduction on KOS to indicate clinically significant functional improvement  2. Patient will perform all ADL's without pain or buckling of the knee.  3. Patient will demonstrate left knee extension MMT 5/5 without pain to increase strength for functional activity performance      Plan   Plan of care Certification: 8/19/2022 to 8/19/2022.    Outpatient Physical Therapy 2-3 times weekly for 12 weeks to include the following interventions: Gait Training, Manual Therapy, Moist Heat/ Ice, Neuromuscular Re-ed, Patient Education, Self Care, Therapeutic Activities and Therapeutic Exercise    Elton Johnson, PT

## 2022-09-08 ENCOUNTER — OFFICE VISIT (OUTPATIENT)
Dept: ORTHOPEDICS | Facility: CLINIC | Age: 32
End: 2022-09-08
Payer: MEDICAID

## 2022-09-08 VITALS — WEIGHT: 293 LBS | HEIGHT: 65 IN | BODY MASS INDEX: 48.82 KG/M2

## 2022-09-08 DIAGNOSIS — M23.91 INTERNAL DERANGEMENT OF RIGHT KNEE: Primary | ICD-10-CM

## 2022-09-08 PROCEDURE — 3008F BODY MASS INDEX DOCD: CPT | Mod: CPTII,,, | Performed by: NURSE PRACTITIONER

## 2022-09-08 PROCEDURE — 1160F PR REVIEW ALL MEDS BY PRESCRIBER/CLIN PHARMACIST DOCUMENTED: ICD-10-PCS | Mod: CPTII,,, | Performed by: NURSE PRACTITIONER

## 2022-09-08 PROCEDURE — 3008F PR BODY MASS INDEX (BMI) DOCUMENTED: ICD-10-PCS | Mod: CPTII,,, | Performed by: NURSE PRACTITIONER

## 2022-09-08 PROCEDURE — 1159F PR MEDICATION LIST DOCUMENTED IN MEDICAL RECORD: ICD-10-PCS | Mod: CPTII,,, | Performed by: NURSE PRACTITIONER

## 2022-09-08 PROCEDURE — 99204 OFFICE O/P NEW MOD 45 MIN: CPT | Mod: S$PBB,,, | Performed by: NURSE PRACTITIONER

## 2022-09-08 PROCEDURE — 1160F RVW MEDS BY RX/DR IN RCRD: CPT | Mod: CPTII,,, | Performed by: NURSE PRACTITIONER

## 2022-09-08 PROCEDURE — 99204 PR OFFICE/OUTPT VISIT, NEW, LEVL IV, 45-59 MIN: ICD-10-PCS | Mod: S$PBB,,, | Performed by: NURSE PRACTITIONER

## 2022-09-08 PROCEDURE — 99215 OFFICE O/P EST HI 40 MIN: CPT | Mod: PBBFAC | Performed by: NURSE PRACTITIONER

## 2022-09-08 PROCEDURE — 1159F MED LIST DOCD IN RCRD: CPT | Mod: CPTII,,, | Performed by: NURSE PRACTITIONER

## 2022-09-08 RX ORDER — LANOLIN ALCOHOL/MO/W.PET/CERES
400 CREAM (GRAM) TOPICAL 2 TIMES DAILY
COMMUNITY
Start: 2022-09-02

## 2022-09-08 RX ORDER — METOPROLOL SUCCINATE 50 MG/1
50 TABLET, EXTENDED RELEASE ORAL 2 TIMES DAILY
COMMUNITY
Start: 2022-09-02

## 2022-09-08 RX ORDER — SEMAGLUTIDE 1.34 MG/ML
INJECTION, SOLUTION SUBCUTANEOUS
COMMUNITY
Start: 2022-09-07

## 2022-09-08 RX ORDER — ISOSORBIDE MONONITRATE 10 MG/1
10 TABLET ORAL DAILY
COMMUNITY
Start: 2022-09-02

## 2022-09-08 RX ORDER — POTASSIUM CHLORIDE 600 MG/1
8 TABLET, FILM COATED, EXTENDED RELEASE ORAL 2 TIMES DAILY
COMMUNITY
Start: 2022-09-02

## 2022-09-08 RX ORDER — PLECANATIDE 3 MG/1
1 TABLET ORAL DAILY
COMMUNITY
Start: 2022-08-31

## 2022-09-08 RX ORDER — BUMETANIDE 2 MG/1
2 TABLET ORAL DAILY
COMMUNITY
Start: 2022-09-02

## 2022-09-08 RX ORDER — KETOCONAZOLE 20 MG/ML
SHAMPOO, SUSPENSION TOPICAL
COMMUNITY
Start: 2022-05-26

## 2022-09-08 RX ORDER — CEPHALEXIN 250 MG/1
1 CAPSULE ORAL 2 TIMES DAILY
COMMUNITY
Start: 2022-08-23

## 2022-09-08 RX ORDER — BLOOD-GLUCOSE METER
EACH MISCELLANEOUS
COMMUNITY
Start: 2022-08-24

## 2022-09-08 RX ORDER — ALBUTEROL SULFATE 90 UG/1
1 AEROSOL, METERED RESPIRATORY (INHALATION)
COMMUNITY
Start: 2022-06-09

## 2022-09-08 RX ORDER — AZELASTINE 1 MG/ML
2 SPRAY, METERED NASAL DAILY
COMMUNITY
Start: 2022-05-24

## 2022-09-08 RX ORDER — MUPIROCIN 20 MG/G
OINTMENT TOPICAL 3 TIMES DAILY
COMMUNITY
Start: 2022-05-09

## 2022-09-08 RX ORDER — LANCETS 30 GAUGE
EACH MISCELLANEOUS
COMMUNITY
Start: 2022-08-26

## 2022-09-08 RX ORDER — ATORVASTATIN CALCIUM 10 MG/1
10 TABLET, FILM COATED ORAL DAILY
COMMUNITY
Start: 2022-09-02

## 2022-09-08 RX ORDER — MEDROXYPROGESTERONE ACETATE 10 MG/1
TABLET ORAL
COMMUNITY
Start: 2022-09-02

## 2022-09-08 RX ORDER — TRIAMCINOLONE ACETONIDE 1 MG/ML
LOTION TOPICAL
COMMUNITY
Start: 2022-05-09

## 2022-09-08 RX ORDER — MELOXICAM 7.5 MG/1
7.5 TABLET ORAL DAILY
Qty: 30 TABLET | Refills: 0 | Status: SHIPPED | OUTPATIENT
Start: 2022-09-08 | End: 2022-10-08

## 2022-09-08 RX ORDER — FAMOTIDINE 40 MG/1
40 TABLET, FILM COATED ORAL DAILY
COMMUNITY
Start: 2022-09-07

## 2022-09-08 RX ORDER — INSULIN DETEMIR 100 [IU]/ML
INJECTION, SOLUTION SUBCUTANEOUS
COMMUNITY
Start: 2022-08-23

## 2022-09-08 RX ORDER — INSULIN ASPART 100 [IU]/ML
INJECTION, SOLUTION INTRAVENOUS; SUBCUTANEOUS
COMMUNITY
Start: 2022-09-06

## 2022-09-08 RX ORDER — GLIPIZIDE 10 MG/1
20 TABLET ORAL 2 TIMES DAILY
COMMUNITY
Start: 2022-09-05

## 2022-09-08 RX ORDER — ONDANSETRON HYDROCHLORIDE 8 MG/1
8 TABLET, FILM COATED ORAL
COMMUNITY
Start: 2022-03-29

## 2022-09-08 NOTE — PROGRESS NOTES
"  Subjective:       New pt .  Patient ID: Katelyn Miller is a 32 y.o. female. Non-smoker. Employment HX: caregiver, currently unemployed.    Seen [facility] ortho for same DX since n/a.   Chief Complaint: Pain of the Right Knee    HPI:    Right stabbing anterior knee pain.   Injury:  multiple falls  Onset: several years ago worsening over time  Modifying Factors: worse with activity and improves with rest  Associated Symptoms: crepitus, decreased ROM, "giving out", locking/ catching with ROM, and "popping"   Activity: sedentary with light activity, pain mildly interferes with ADLs , and use of assistive device cane  Previous Treatments:  HEP withTheraBand, RX NSAIDs, RX PT completed 1 wks/ 2 xs per week d/c'd currently in RX PT, and CSI since 2022 last injection 8/2022  without symptom relief  PMH: + DM  and HX "mini-stroke" 2018, DX fibromyalgia   Family History: + OA    Note: Seen by Dr. Mcgee but no longer insured with private insurance.  New referral for right knee pain with conservative treatments that have failed.     Current Outpatient Medications:     ADVAIR DISKUS 100-50 mcg/dose diskus inhaler, 1 puff 2 (two) times daily., Disp: , Rfl:     albuterol (PROVENTIL/VENTOLIN HFA) 90 mcg/actuation inhaler, 1 puff every 3 (three) hours as needed., Disp: , Rfl:     atorvastatin (LIPITOR) 10 MG tablet, Take 10 mg by mouth once daily., Disp: , Rfl:     azelastine (ASTELIN) 137 mcg (0.1 %) nasal spray, 2 sprays once daily., Disp: , Rfl:     bumetanide (BUMEX) 2 MG tablet, Take by mouth., Disp: , Rfl:     famotidine (PEPCID) 40 MG tablet, Take 40 mg by mouth once daily., Disp: , Rfl:     glipiZIDE (GLUCOTROL) 10 MG tablet, Take 20 mg by mouth 2 (two) times daily., Disp: , Rfl:     isosorbide mononitrate (ISMO,MONOKET) 10 mg tablet, Take 10 mg by mouth once daily., Disp: , Rfl:     ketoconazole (NIZORAL) 2 % shampoo, Apply topically every 7 days., Disp: , Rfl:     LEVEMIR FLEXTOUCH U-100 INSULN 100 unit/mL (3 mL) InPn pen, Inject " "into the skin., Disp: , Rfl:     magnesium oxide (MAG-OX) 400 mg (241.3 mg magnesium) tablet, Take by mouth., Disp: , Rfl:     medroxyPROGESTERone (PROVERA) 10 MG tablet, Take by mouth., Disp: , Rfl:     metoprolol succinate (TOPROL-XL) 50 MG 24 hr tablet, Take 50 mg by mouth 2 (two) times daily., Disp: , Rfl:     mupirocin (BACTROBAN) 2 % ointment, 3 (three) times daily., Disp: , Rfl:     NOVOLOG FLEXPEN U-100 INSULIN 100 unit/mL (3 mL) InPn pen, Inject into the skin., Disp: , Rfl:     ondansetron (ZOFRAN) 8 MG tablet, 8 mg., Disp: , Rfl:     ONETOUCH VERIO REFLECT METER Misc, use as directed, Disp: , Rfl:     ONETOUCH VERIO TEST STRIPS Strp, 5 (five) times daily., Disp: , Rfl:     OZEMPIC 0.25 mg or 0.5 mg(2 mg/1.5 mL) pen injector, Inject into the skin., Disp: , Rfl:     potassium chloride (KLOR-CON) 8 MEQ TbSR, Take by mouth., Disp: , Rfl:     triamcinolone acetonide 0.1% (KENALOG) 0.1 % Lotn, SMARTSI Drop(s) In Ear(s) Every Night, Disp: , Rfl:     TRULANCE 3 mg Tab, Take 1 tablet by mouth once daily., Disp: , Rfl:   Review of patient's allergies indicates:   Allergen Reactions    Ciprofloxacin     Hydrochlorothiazide     Hydromorphone Itching     Other reaction(s): Injection site burning  has had demerol previously with itching, alleviated with benadryl.    Peanut     Penicillins      Other reaction(s): vaginal burning    Sitagliptin     Sulfamethoxazole     Ziprasidone      Other reaction(s): "withdraw"     No results found for: HGBA1C   Body mass index is 50.02 kg/m².   Vitals:    22 1212   Weight: 134.3 kg (296 lb)   Height: 5' 4.5" (1.638 m)   PainSc:   9      Review of Systems:  A ten-point review of systems was performed and is negative, except as mentioned above   Objective:   MSK Bilateral Knee  General:  No apparent distress, no pain indicators, morbid obesity   Inspection: limping gait, mild effusion, partial weight bearing- cane in use, no erythema, no contusion, mild quad deconditioning, " varus deformity   Palpation: RIGHT knee tenderness with palpation at medial joint line, peripatellar soft tissue and patellar tendonn,  non-tender tibial plateau  ROM:    Active Flexion / Extension (0-140):  4 / 101 painful (R)  0 / 135 non-painful (L)  Strength:   Flexion  3 / 5 (R)   5 / 5 (L)  Extension  3 / 5 (R)   5 / 5 (L)  Special Testing:  IT Band Testing  Betty's Test:    + (R)  -- (L)  Effusion Testing  Ballotable Effusion:   -- (R)  -- (L)  Fluid Wave:    -- (R)  -- (L)  Patellar Testing  Patellar Grind:    + (R)  -- (L)  Patellar Facet Pain:   + (R)  -- (L)  Apprehension:    -- (R)  -- (L)  Meniscal Testing  Lupe's test:     + (R)  -- (L)  Thessaly's Test:      Not tested (R)  not tested (L)  Ligament Testing  ACL Anterior Drawer:   -- (R) -- (L)  PCL Posterior Drawer:   -- (R) -- (L)  LCL Varus Test:    -- (R) -- (L)  MCL Valgus Test:   -- (R) -- (L)  Hip Exam normal  Ankle Exam normal  Neurovascular: Intact to light touch  Neuro/ Psych: Awake, alert, oriented, normal mood and affect  Lymphatic: No LAD  Skin/ Soft Tissue: no rash, skin intact  Assessment:       Imaging: X-ray 4 views of right knee dated 7/25/22, reviewed and independently interpreted by me. Discussed with patient. No acute findings .    XR KNEE COMP 4 OR MORE VIEWS RIGHT 7/25/22  CLINICAL HISTORY:  Pain in right knee  TECHNIQUE:  AP, oblique and lateral views of the right knee  COMPARISON:  None  FINDINGS:  No acute fracture identified.  Joint spaces are maintained with anatomic alignment.  No significant knee effusion.  Impression:  No acute osseous process appreciated.    EMR Review: completed with noted Referral documentation reviewed    1. Internal derangement of right knee    2. BMI 50.0-59.9, adult      Plan:       Ongoing education about DX and treatment recommendations including conservative treatments of daily HEP with TheraBand, BMI reduction goal 5-10% of body weight (230# overall goal for BMI <40), muscle strengthening  with use of stationary bike (RPM set at 80 or > with slow progression to goal of 40 minutes 3-4 times per week as tolerated), adequate vit D/C, glucosamine 1500 mg/day and daily acetaminophen 1000 mg 3 times/ day if able to tolerate.    Treatment plan: Mild knee arthritis with internal derangement post-fall MRI ordered s/t failed conservative treatments including: RX NSAID, formal RX PT, CSI, and HEP > 3 months with inadequate relief.  Patient continues with symptoms of meniscal injury despite > 6 weeks treatment.  MRI required to evaluate need for surgical treatments.    Procedure: n/a  RX Medications: continue medications as RX per PCP  RTC: after imaging complete    Marivel Reza FNDAVIN    Timed Billing Note  Total Time Spent with Patient: 40 minutes  Visit Start Time: 1240  10 minutes spent prior to visit reviewing EMR, prior labs and x-rays.  20 minutes spent in visit with patient completing exam, obtaining history, educating on DX and treatment plan.  10 minutes spent after visit completing EMR documentation.   Visit End Time: 1320

## 2022-09-09 ENCOUNTER — CLINICAL SUPPORT (OUTPATIENT)
Dept: REHABILITATION | Facility: HOSPITAL | Age: 32
End: 2022-09-09
Payer: MEDICAID

## 2022-09-09 DIAGNOSIS — S83.231A COMPLEX TEAR OF MEDIAL MENISCUS OF RIGHT KNEE, UNSPECIFIED WHETHER OLD OR CURRENT TEAR, INITIAL ENCOUNTER: Primary | ICD-10-CM

## 2022-09-09 PROCEDURE — 97110 THERAPEUTIC EXERCISES: CPT

## 2022-09-09 NOTE — PLAN OF CARE
Physical Therapy Treatment Note     Name: Katelyn Miller  Clinic Number: [id number]    Therapy Diagnosis: No diagnosis found.  Physician: Ruben Mcgee MD    Visit Date: 9/9/2022    Physician Orders: PT Eval and Treat  Medical Diagnosis from Referral: Complex tear of medial meniscus of the right knee  Evaluation Date: 8/19/2022  Authorization Period Expiration: 11/19/2022  Plan of Care Expiration: 11/19/2022  Visit # / Visits authorized: 1/36    Time In: 1407  Time Out: 1502  Total Billable Time: 55 minutes    Surgery: none  Orthopedic Precautions: none  Pertinent History: Hx of TIA    Subjective     Katelyn comes back to therapy today after missing several visits due to not having a ride to come.  She reports that her knee is hurting today. Did see ortho  who ordered a MRI for next week. Has been doing her exercises at home and has been trying to walk more. She agrees to PT session.    Response to previous treatment: N/A    Pain: 8/10  Location: right knee      Outcome Measure:  Eval: Knee Outcome Survey 2/70 = 97.1% disability, 2.9% functional    Objective     Katelyn received the following treatment:     Time Activities   Manual     TherAct     TherEx 45 Quad sets, ham sets, glute sets, heel slides, hip abd, SAQ, SLR, Bridging, seated hip flex, LAQ, Hamstring curls, heel raises, 4 way hip   Gait     Neuro Re-ed     Modalities 10 CP to the right knee   E-Stim     Dry Needling     Canalith Repositioning           Home Exercises Provided and Patient Education Provided     Education provided:   - HEP, plan of care     Written Home Exercises Provided: yes.  Exercises were reviewed and Katelyn was able to demonstrate them prior to the end of the session. Gin demonstrated good understanding of the education provided.     Assessment     Katelyn is a 32 y.o. female referred to outpatient Physical Therapy with a medical diagnosis of Complex tear of medial meniscus of the right knee.    Knee found to be a little puffy today  along with medial joint line tenderness. Katelyn completed all exercises pretty well. We reviewed her home exercises.    Patient prognosis is Good.      Anticipated barriers to physical therapy: none    Goals: Katelyn  (TBD) progressing well towards her goals.    Short Term Goals: 6 weeks  Patient will report at least 10% disability reduction on KOS to indicate clinically significant functional improvement  Patient will demonstrate improved ability to navigate up and down the steps into her home without fear/sensation of buckling  Patient will demonstrate improved quality of gait with equal weightbearing each limb  Patient will be independent with HEP to help improve mechanics and increase functional capacity     Long Term Goals: 12 weeks   Patient will report at least 20% disability reduction on KOS to indicate clinically significant functional improvement  Patient will perform all ADL's without pain or buckling of the knee.  Patient will demonstrate left knee extension MMT 5/5 without pain to increase strength for functional activity performance    Plan     Plan of care Certification: 8/19/2022 to 8/19/2022.     Outpatient Physical Therapy 2-3 times weekly for 12 weeks to include the following interventions: Gait Training, Manual Therapy, Moist Heat/ Ice, Neuromuscular Re-ed, Patient Education, Self Care, Therapeutic Activities and Therapeutic Exercise    Elton Johnson, PT

## 2022-09-15 ENCOUNTER — OFFICE VISIT (OUTPATIENT)
Dept: URGENT CARE | Facility: CLINIC | Age: 32
End: 2022-09-15
Payer: MEDICAID

## 2022-09-15 VITALS
SYSTOLIC BLOOD PRESSURE: 119 MMHG | TEMPERATURE: 99 F | WEIGHT: 293 LBS | HEIGHT: 64 IN | HEART RATE: 109 BPM | RESPIRATION RATE: 18 BRPM | DIASTOLIC BLOOD PRESSURE: 86 MMHG | BODY MASS INDEX: 50.02 KG/M2 | OXYGEN SATURATION: 98 %

## 2022-09-15 DIAGNOSIS — R53.83 FATIGUE, UNSPECIFIED TYPE: Primary | ICD-10-CM

## 2022-09-15 DIAGNOSIS — J06.9 UPPER RESPIRATORY TRACT INFECTION, UNSPECIFIED TYPE: ICD-10-CM

## 2022-09-15 DIAGNOSIS — J02.9 SORE THROAT: ICD-10-CM

## 2022-09-15 DIAGNOSIS — R42 DIZZY: ICD-10-CM

## 2022-09-15 LAB
FLUAV AG UPPER RESP QL IA.RAPID: NOT DETECTED
FLUBV AG UPPER RESP QL IA.RAPID: NOT DETECTED
RSV A 5' UTR RNA NPH QL NAA+PROBE: NOT DETECTED
SARS-COV-2 RNA RESP QL NAA+PROBE: NOT DETECTED
STREP A PCR (OHS): NOT DETECTED

## 2022-09-15 PROCEDURE — 99214 OFFICE O/P EST MOD 30 MIN: CPT | Mod: PBBFAC

## 2022-09-15 PROCEDURE — 87631 RESP VIRUS 3-5 TARGETS: CPT

## 2022-09-15 PROCEDURE — 99214 OFFICE O/P EST MOD 30 MIN: CPT | Mod: S$PBB,,,

## 2022-09-15 PROCEDURE — 87636 SARSCOV2 & INF A&B AMP PRB: CPT | Mod: 59

## 2022-09-15 PROCEDURE — 99214 PR OFFICE/OUTPT VISIT, EST, LEVL IV, 30-39 MIN: ICD-10-PCS | Mod: S$PBB,,,

## 2022-09-15 RX ORDER — PROMETHAZINE HYDROCHLORIDE AND DEXTROMETHORPHAN HYDROBROMIDE 6.25; 15 MG/5ML; MG/5ML
5 SYRUP ORAL EVERY 4 HOURS PRN
Qty: 180 ML | Refills: 0 | Status: SHIPPED | OUTPATIENT
Start: 2022-09-15 | End: 2022-09-25

## 2022-09-15 NOTE — PROGRESS NOTES
"Subjective:       Patient ID: Katelyn Miller is a 32 y.o. female.    Chief Complaint: Cough, Fatigue, Dizziness, Sore Throat (Currently taking Z pack per PCP for nasal congestion), Abdominal Pain (When coughing), and negative covid home test x 4 days ago      HPI  33 yo female with cough, fatigue, dizziness, sore throat, and abdominal pain with cough.  Negative home COVID test 4 days ago.  Has tried OTC medications with no improvement.    Review of Systems   Constitutional:         As above   HENT:          As above   Respiratory:          As above   Gastrointestinal:         As above       Objective:       Vital Signs  Temp: 98.6 °F (37 °C)  Pulse: 109  Resp: 18  SpO2: 98 %  BP: 119/86  Height and Weight  Height: 5' 3.78" (162 cm)  Weight: 135 kg (297 lb 9.6 oz)  BSA (Calculated - sq m): 2.46 sq meters  BMI (Calculated): 51.4  Weight in (lb) to have BMI = 25: 144.3]  Physical Exam  Constitutional:       Appearance: Normal appearance.   HENT:      Head: Normocephalic and atraumatic.      Nose: Congestion and rhinorrhea present.      Mouth/Throat:      Pharynx: Posterior oropharyngeal erythema present. No oropharyngeal exudate.   Eyes:      Extraocular Movements: Extraocular movements intact.      Conjunctiva/sclera: Conjunctivae normal.   Cardiovascular:      Rate and Rhythm: Normal rate and regular rhythm.      Heart sounds: Normal heart sounds.   Pulmonary:      Breath sounds: Normal breath sounds.   Musculoskeletal:      Cervical back: Tenderness present.   Lymphadenopathy:      Cervical: Cervical adenopathy present.   Skin:     General: Skin is warm and dry.   Neurological:      General: No focal deficit present.      Mental Status: She is alert.   Psychiatric:         Mood and Affect: Mood and affect normal.         Speech: Speech normal.         Behavior: Behavior normal. Behavior is cooperative.         Thought Content: Thought content does not include homicidal or suicidal ideation.       Assessment:     "   Problem List Items Addressed This Visit    None  Visit Diagnoses       Fatigue, unspecified type    -  Primary    Relevant Orders    COVID/RSV/FLU A&B PCR    Dizzy        Relevant Orders    COVID/RSV/FLU A&B PCR    Sore throat        Relevant Orders    Strep Group A by PCR    Upper respiratory tract infection, unspecified type                  Plan:           Encouraged ibuprofen/acetaminophen  ER precautions  FU with PCP

## 2022-09-28 ENCOUNTER — OFFICE VISIT (OUTPATIENT)
Dept: ORTHOPEDICS | Facility: CLINIC | Age: 32
End: 2022-09-28
Payer: MEDICAID

## 2022-09-28 VITALS — WEIGHT: 291 LBS | BODY MASS INDEX: 51.56 KG/M2 | HEIGHT: 63 IN

## 2022-09-28 DIAGNOSIS — S83.281D OTHER TEAR OF LATERAL MENISCUS OF RIGHT KNEE AS CURRENT INJURY, SUBSEQUENT ENCOUNTER: Primary | ICD-10-CM

## 2022-09-28 PROBLEM — S83.281A TEAR OF LATERAL MENISCUS OF RIGHT KNEE, CURRENT: Status: ACTIVE | Noted: 2022-09-28

## 2022-09-28 PROCEDURE — 3008F PR BODY MASS INDEX (BMI) DOCUMENTED: ICD-10-PCS | Mod: CPTII,,, | Performed by: NURSE PRACTITIONER

## 2022-09-28 PROCEDURE — 1160F PR REVIEW ALL MEDS BY PRESCRIBER/CLIN PHARMACIST DOCUMENTED: ICD-10-PCS | Mod: CPTII,,, | Performed by: NURSE PRACTITIONER

## 2022-09-28 PROCEDURE — 1160F RVW MEDS BY RX/DR IN RCRD: CPT | Mod: CPTII,,, | Performed by: NURSE PRACTITIONER

## 2022-09-28 PROCEDURE — 99214 OFFICE O/P EST MOD 30 MIN: CPT | Mod: S$PBB,,, | Performed by: NURSE PRACTITIONER

## 2022-09-28 PROCEDURE — 99214 PR OFFICE/OUTPT VISIT, EST, LEVL IV, 30-39 MIN: ICD-10-PCS | Mod: S$PBB,,, | Performed by: NURSE PRACTITIONER

## 2022-09-28 PROCEDURE — 3008F BODY MASS INDEX DOCD: CPT | Mod: CPTII,,, | Performed by: NURSE PRACTITIONER

## 2022-09-28 PROCEDURE — 1159F PR MEDICATION LIST DOCUMENTED IN MEDICAL RECORD: ICD-10-PCS | Mod: CPTII,,, | Performed by: NURSE PRACTITIONER

## 2022-09-28 PROCEDURE — 1159F MED LIST DOCD IN RCRD: CPT | Mod: CPTII,,, | Performed by: NURSE PRACTITIONER

## 2022-09-28 PROCEDURE — 99214 OFFICE O/P EST MOD 30 MIN: CPT | Mod: PBBFAC | Performed by: NURSE PRACTITIONER

## 2022-09-28 NOTE — PROGRESS NOTES
"  Subjective:       Follow up .  Patient ID: Katelyn Miller is a 32 y.o. female. Non-smoker. Employment HX: caregiver, currently unemployed.    Seen [facility] ortho for same DX since n/a.   Chief Complaint: Pain of the Right Knee and Results    HPI:    Right stabbing anterior knee pain.   Injury:  multiple falls  Onset: several years ago worsening over time  Modifying Factors: worse with activity and improves with rest  Associated Symptoms: crepitus, decreased ROM, "giving out", locking/ catching with ROM, and "popping"   Activity: sedentary with light activity, pain mildly interferes with ADLs , and use of assistive device cane  Previous Treatments:  HEP withTheraBand, RX NSAIDs, RX PT completed 1 wks/ 2 xs per week d/c'd currently in RX PT, and CSI since 2022 last injection 8/2022  without symptom relief  PMH: + DM  and HX "mini-stroke" 2018, DX fibromyalgia   Family History: + OA    Note: Seen by Dr. Mcgee but no longer insured with private insurance.  Here for MRI results.  Continues with mechanical locking and falls s/t to locking and desires surgical treatment options.       Current Outpatient Medications:     ADVAIR DISKUS 100-50 mcg/dose diskus inhaler, 1 puff 2 (two) times daily., Disp: , Rfl:     albuterol (PROVENTIL/VENTOLIN HFA) 90 mcg/actuation inhaler, 1 puff every 3 (three) hours as needed., Disp: , Rfl:     atorvastatin (LIPITOR) 10 MG tablet, Take 10 mg by mouth once daily., Disp: , Rfl:     azelastine (ASTELIN) 137 mcg (0.1 %) nasal spray, 2 sprays once daily., Disp: , Rfl:     bumetanide (BUMEX) 2 MG tablet, Take by mouth., Disp: , Rfl:     famotidine (PEPCID) 40 MG tablet, Take 40 mg by mouth once daily., Disp: , Rfl:     glipiZIDE (GLUCOTROL) 10 MG tablet, Take 20 mg by mouth 2 (two) times daily., Disp: , Rfl:     isosorbide mononitrate (ISMO,MONOKET) 10 mg tablet, Take 10 mg by mouth once daily., Disp: , Rfl:     ketoconazole (NIZORAL) 2 % shampoo, Apply topically every 7 days., Disp: , Rfl:     LEVEMIR " "FLEXTOUCH U-100 INSULN 100 unit/mL (3 mL) InPn pen, Inject into the skin., Disp: , Rfl:     magnesium oxide (MAG-OX) 400 mg (241.3 mg magnesium) tablet, Take by mouth., Disp: , Rfl:     medroxyPROGESTERone (PROVERA) 10 MG tablet, Take by mouth., Disp: , Rfl:     meloxicam (MOBIC) 7.5 MG tablet, Take 1 tablet (7.5 mg total) by mouth once daily., Disp: 30 tablet, Rfl: 0    metoprolol succinate (TOPROL-XL) 50 MG 24 hr tablet, Take 50 mg by mouth 2 (two) times daily., Disp: , Rfl:     NOVOLOG FLEXPEN U-100 INSULIN 100 unit/mL (3 mL) InPn pen, Inject into the skin., Disp: , Rfl:     ondansetron (ZOFRAN) 8 MG tablet, 8 mg., Disp: , Rfl:     ONETOUCH VERIO REFLECT METER Misc, use as directed, Disp: , Rfl:     ONETOUCH VERIO TEST STRIPS Strp, 5 (five) times daily., Disp: , Rfl:     OZEMPIC 0.25 mg or 0.5 mg(2 mg/1.5 mL) pen injector, Inject into the skin., Disp: , Rfl:     potassium chloride (KLOR-CON) 8 MEQ TbSR, Take by mouth., Disp: , Rfl:     TRULANCE 3 mg Tab, Take 1 tablet by mouth once daily., Disp: , Rfl:     mupirocin (BACTROBAN) 2 % ointment, 3 (three) times daily., Disp: , Rfl:     triamcinolone acetonide 0.1% (KENALOG) 0.1 % Lotn, SMARTSI Drop(s) In Ear(s) Every Night, Disp: , Rfl:   Review of patient's allergies indicates:   Allergen Reactions    Azithromycin     Bupropion     Choline,magnesium salicylate     Ciprofloxacin     Dulaglutide     Hydrochlorothiazide     Hydromorphone Itching     Other reaction(s): Injection site burning  has had demerol previously with itching, alleviated with benadryl.    Liraglutide     Peanut     Penicillins      Other reaction(s): vaginal burning    Sitagliptin     Sulfamethoxazole     Ziprasidone      Other reaction(s): "withdraw"    Cefixime Nausea Only     No results found for: HGBA1C   Body mass index is 51.55 kg/m².   Vitals:    22 1428   Weight: 132 kg (291 lb)   Height: 5' 3" (1.6 m)   PainSc: 10-Worst pain ever      Review of Systems:  A ten-point review of " systems was performed and is negative, except as mentioned above   Objective:   MSK Bilateral Knee  General:  No apparent distress, no pain indicators, morbid obesity   Inspection: limping gait, mild effusion, partial weight bearing- cane in use, no erythema, no contusion, mild quad deconditioning, varus deformity   Palpation: RIGHT knee tenderness with palpation at medial joint line, peripatellar soft tissue and patellar tendonn,  non-tender tibial plateau  ROM:    Active Flexion / Extension (0-140):  4 / 101 painful (R)  0 / 135 non-painful (L)  Strength:   Flexion  3 / 5 (R)   5 / 5 (L)  Extension  3 / 5 (R)   5 / 5 (L)  Special Testing:  IT Band Testing  Betty's Test:    + (R)  -- (L)  Effusion Testing  Ballotable Effusion:   -- (R)  -- (L)  Fluid Wave:    -- (R)  -- (L)  Patellar Testing  Patellar Grind:    + (R)  -- (L)  Patellar Facet Pain:   + (R)  -- (L)  Apprehension:    -- (R)  -- (L)  Meniscal Testing  Lupe's test:     + (R)  -- (L)  Thessaly's Test:      Not tested (R)  not tested (L)  Ligament Testing  ACL Anterior Drawer:   -- (R) -- (L)  PCL Posterior Drawer:   -- (R) -- (L)  LCL Varus Test:    -- (R) -- (L)  MCL Valgus Test:   -- (R) -- (L)  Hip Exam normal  Ankle Exam normal  Neurovascular: Intact to light touch  Neuro/ Psych: Awake, alert, oriented, normal mood and affect  Lymphatic: No LAD  Skin/ Soft Tissue: no rash, skin intact  Assessment:       Imaging: X-ray 4 views of right knee dated 7/25/22, reviewed and independently interpreted by me. Discussed with patient. No acute findings .    XR KNEE COMP 4 OR MORE VIEWS RIGHT 7/25/22  CLINICAL HISTORY:  Pain in right knee  TECHNIQUE:  AP, oblique and lateral views of the right knee  COMPARISON:  None  FINDINGS:  No acute fracture identified.  Joint spaces are maintained with anatomic alignment.  No significant knee effusion.  Impression:  No acute osseous process appreciated.    MRI KNEE WITHOUT CONTRAST RIGHT 9/24/22  CLINICAL HISTORY:  Knee  pain, chronic, degenerative disease on xray (Age >= 5y);Meniscal tear, untreated, new symptoms;Knee trauma, internal derangement suspected, xray done;Unspecified internal derangement of right knee  TECHNIQUE:  Multiplanar, multisequence images were performed about the knee.  COMPARISON:  Knee radiographs July 24, 2022  FINDINGS:  There is no significant knee joint effusion or Baker cyst.  A longitudinal superior surface tear is present in the lateral meniscus anterior horn and body (example series 5, image 19).  The medial meniscus is intact.  A ganglion cyst is present arising from the proximal anterior cruciate ligament measuring approximately 2.2 cm craniocaudal by 1.2 cm transverse.  The cruciate ligaments, medial collateral ligaments, lateral collateral ligamentous complex, and extensor mechanism are intact.  There is no fatty muscle atrophy or muscle edema.  The semimembranosus, biceps femoris, and popliteus tendons are intact.  No full-thickness cartilage loss is identified.  There is no marrow signal abnormality or osseous malalignment.  Impression:  Right knee longitudinal superior articular surface tear involving the lateral meniscus anterior horn and body.  2.2 cm ganglion cyst arising from the anterior cruciate ligament.    EMR Review: completed with noted Referral documentation reviewed    1. Other tear of lateral meniscus of right knee as current injury, subsequent encounter    2. BMI 50.0-59.9, adult      Plan:       Ongoing education about DX and treatment recommendations including conservative treatments of daily HEP with TheraBand, BMI reduction goal 5-10% of body weight (230# overall goal for BMI <40), muscle strengthening with use of stationary bike (RPM set at 80 or > with slow progression to goal of 40 minutes 3-4 times per week as tolerated), adequate vit D/C, glucosamine 1500 mg/day and daily acetaminophen 1000 mg 3 times/ day if able to tolerate.    Treatment plan: Mild knee arthritis right  with lateral horizontal meniscal tear w/ mechanical locking Educated on MRI findings and honest discussion had about need to reduce BMI.  Patient continues with mechanical locking causing falls and would like to discuss surgical treatment options with ortho res.    Procedure: n/a  RX Medications: continue medications as RX per PCP  RTC: next available appt. with ortho res.     Marivel THORNTON    Timed Billing Note  Total Time Spent with Patient: 30 minutes  Visit Start Time: 1445  10 minutes spent prior to visit reviewing EMR, prior labs and x-rays.  10 minutes spent in visit with patient completing exam, obtaining history, educating on DX and treatment plan.  10 minutes spent after visit completing EMR documentation.   Visit End Time: 7385

## 2022-11-02 ENCOUNTER — OFFICE VISIT (OUTPATIENT)
Dept: ORTHOPEDICS | Facility: CLINIC | Age: 32
End: 2022-11-02
Payer: MEDICAID

## 2022-11-02 VITALS — WEIGHT: 287 LBS | BODY MASS INDEX: 50.85 KG/M2 | HEIGHT: 63 IN

## 2022-11-02 DIAGNOSIS — S83.281D OTHER TEAR OF LATERAL MENISCUS OF RIGHT KNEE AS CURRENT INJURY, SUBSEQUENT ENCOUNTER: Primary | ICD-10-CM

## 2022-11-02 PROCEDURE — 1159F MED LIST DOCD IN RCRD: CPT | Mod: CPTII,,, | Performed by: ORTHOPAEDIC SURGERY

## 2022-11-02 PROCEDURE — 99214 PR OFFICE/OUTPT VISIT, EST, LEVL IV, 30-39 MIN: ICD-10-PCS | Mod: S$PBB,,, | Performed by: ORTHOPAEDIC SURGERY

## 2022-11-02 PROCEDURE — 99214 OFFICE O/P EST MOD 30 MIN: CPT | Mod: PBBFAC

## 2022-11-02 PROCEDURE — 3008F PR BODY MASS INDEX (BMI) DOCUMENTED: ICD-10-PCS | Mod: CPTII,,, | Performed by: ORTHOPAEDIC SURGERY

## 2022-11-02 PROCEDURE — 1159F PR MEDICATION LIST DOCUMENTED IN MEDICAL RECORD: ICD-10-PCS | Mod: CPTII,,, | Performed by: ORTHOPAEDIC SURGERY

## 2022-11-02 PROCEDURE — 3008F BODY MASS INDEX DOCD: CPT | Mod: CPTII,,, | Performed by: ORTHOPAEDIC SURGERY

## 2022-11-02 PROCEDURE — 99214 OFFICE O/P EST MOD 30 MIN: CPT | Mod: S$PBB,,, | Performed by: ORTHOPAEDIC SURGERY

## 2022-11-02 NOTE — PROGRESS NOTES
"[facility]opaedic [facility] Clinic Note    In brief, Katelyn Miller is a 32 y.o. female presenting with right knee pain     Patient states that over the past year began to experience right knee pain.  No traumatic inciting event.  Insidious onset.  Worsening over time.  Worse with activity.  Occasional locking however no limitations to motion.  Minimally participate in physical therapy previously.  Had a knee injection in September that only provided 2-3 days of relief.  Otherwise no other interventions.    Otherwise, no prior trauma.  No surgeries to right lower extremity.  Past medical history consistent with diabetes, hypertension, sickle cell trait, restless leg syndrome and fibromyalgia.  Patient does not smoke use nicotine products.      PMH:   Past Medical History:   Diagnosis Date    Asthma     Congenital heart disease     Diabetes mellitus type I     Hypertension     Respiratory distress     Stroke        PSH:   Past Surgical History:   Procedure Laterality Date     SECTION      GALLBLADDER SURGERY      GERD surgery      TONSILLECTOMY         SH:   Social History     Socioeconomic History    Marital status:    Tobacco Use    Smoking status: Never    Smokeless tobacco: Never   Substance and Sexual Activity    Alcohol use: Not Currently    Drug use: Never   Social History Narrative    ** Merged History Encounter **            FH: History reviewed. No pertinent family history.    Allergies:   Review of patient's allergies indicates:   Allergen Reactions    Azithromycin     Bupropion     Choline,magnesium salicylate     Ciprofloxacin     Dulaglutide     Hydrochlorothiazide     Hydromorphone Itching     Other reaction(s): Injection site burning  has had demerol previously with itching, alleviated with benadryl.    Liraglutide     Peanut     Penicillins      Other reaction(s): vaginal burning    Sitagliptin     Sulfamethoxazole     Ziprasidone      Other reaction(s): "withdraw"    Cefixime Nausea Only "       ROS:  Constitutional- no fever, fatigue, weakness  Eye- no vision loss, eye redness, drainage, or pain  ENMT- no sore throat, ear pain, sinus pain/congestion, nasal congestion/drainage  Respiratory- no cough, wheezing, or shortness of breath  CV- no chest pain, no palpitations, no edema  GI- no N/V/D; no abdominal pain    Physical Exam:  There were no vitals filed for this visit.    General: NAD  Cardio: RRR by peripheral pulse  Pulm: Normal WOB on room air, symmetric chest rise  Abd: Soft, NT/ND    MSK:  No significant effusion   Some tenderness along the lateral compartment   Knee range of motion 0-115; some pain with flexion   No varus or valgus instability  Firm endpoint with Lachman testing   Equivocal Lupe  Negative anterior-posterior drawer   Sensation intact to light touch distally   DP palpable    Imaging:   Radiographs and MRI of the right knee independently reviewed demonstrate some osteochondral damage to lateral compartment of the knee; there is evidence of bony infarcts both in the femur and the tibia; evidence of a complex lateral meniscal tear    Assessment:  32-year-old female chronic right knee pain with lateral meniscal tear as well as underlying osteochondral manage    Discussed the nature of the patient's pathology.  Discussed that she has a meniscal tear as well as underlying cartilage damage.  Discussed non operative and operative treatment.  At this time patient would like to pursue non operative treatment and try another course of physical therapy.  This is reasonable.  Physical therapy prescribed today.  Patient will follow-up in 2-3 months for repeat clinical evaluation.  If patient's symptoms fail to improve or worsen discussed possible surgical interventions that would likely address only her meniscal symptoms and not her arthritic symptoms.  Patient understands this and agrees with the treatment plan.  Discussed activity modification.  Quad and hamstring stretching and  strengthening.  Anti-inflammatory medication as needed.  Follow-up in 2-3 months.    Silvestre Almanza MD  [facility] Orthopaedic Surgery  11/2/2022 3:08 PM

## 2022-11-03 NOTE — PROGRESS NOTES
ATTENDING ADDENDUM    Katelyn Miller  was evaluated at the time of the encounter with Dr Almanza PGY5.  HPI, PE and treatment plan was reviewed. Treatment plan was reasonable and necessary. Imaging was reviewed at the time of visit.     I was present during critical or key resident-provided service and procedure portions of the visit.

## 2023-01-04 ENCOUNTER — OFFICE VISIT (OUTPATIENT)
Dept: ORTHOPEDICS | Facility: CLINIC | Age: 33
End: 2023-01-04
Payer: MEDICAID

## 2023-01-04 VITALS — SYSTOLIC BLOOD PRESSURE: 91 MMHG | DIASTOLIC BLOOD PRESSURE: 68 MMHG | HEART RATE: 105 BPM

## 2023-01-04 DIAGNOSIS — S83.281D OTHER TEAR OF LATERAL MENISCUS OF RIGHT KNEE AS CURRENT INJURY, SUBSEQUENT ENCOUNTER: Primary | ICD-10-CM

## 2023-01-04 PROCEDURE — 99213 PR OFFICE/OUTPT VISIT, EST, LEVL III, 20-29 MIN: ICD-10-PCS | Mod: S$PBB,,, | Performed by: SPECIALIST

## 2023-01-04 PROCEDURE — 3078F PR MOST RECENT DIASTOLIC BLOOD PRESSURE < 80 MM HG: ICD-10-PCS | Mod: CPTII,,, | Performed by: SPECIALIST

## 2023-01-04 PROCEDURE — 99213 OFFICE O/P EST LOW 20 MIN: CPT | Mod: PBBFAC

## 2023-01-04 PROCEDURE — 3074F SYST BP LT 130 MM HG: CPT | Mod: CPTII,,, | Performed by: SPECIALIST

## 2023-01-04 PROCEDURE — 99213 OFFICE O/P EST LOW 20 MIN: CPT | Mod: S$PBB,,, | Performed by: SPECIALIST

## 2023-01-04 PROCEDURE — 3074F PR MOST RECENT SYSTOLIC BLOOD PRESSURE < 130 MM HG: ICD-10-PCS | Mod: CPTII,,, | Performed by: SPECIALIST

## 2023-01-04 PROCEDURE — 3078F DIAST BP <80 MM HG: CPT | Mod: CPTII,,, | Performed by: SPECIALIST

## 2023-01-04 RX ORDER — MELOXICAM 15 MG/1
15 TABLET ORAL DAILY
Qty: 30 TABLET | Refills: 0 | Status: SHIPPED | OUTPATIENT
Start: 2023-01-04

## 2023-01-04 RX ORDER — ARIPIPRAZOLE 5 MG/1
5 TABLET ORAL
COMMUNITY
Start: 2022-12-08

## 2023-01-04 NOTE — LETTER
January 4, 2023      [facility]  [address] [place] [id number]  Phone: [phone]       Patient: Katelyn Lopez   YOB: 1990  Date of Visit: 01/04/2023    To Whom It May Concern:    Niranjan Lopez  was at [facility] on 01/04/2023. The patient may return to work/school on 01/04/2023 with no restrictions. If you have any questions or concerns, or if I can be of further assistance, please do not hesitate to contact me.    Sincerely,    Divine Soni MA

## 2023-01-04 NOTE — PROGRESS NOTES
[facility] Orthopaedic Surgery Clinic Note    In brief, Katelyn Lopez is a 32 y.o. female presenting with right knee pain, chronic right lateral meniscus tear     Patient states that over the past year began to experience right knee pain.  No traumatic inciting event.  Insidious onset.  Worsening over time.  Worse with activity.  Occasional locking however no limitations to motion. Had a knee injection in September that only provided 2-3 days of relief.  Otherwise no other interventions.    Otherwise, no prior trauma.  No surgeries to right lower extremity.  Past medical history consistent with diabetes, hypertension, sickle cell trait, restless leg syndrome and fibromyalgia.  Patient does not smoke use nicotine products.  She has now completed a full course of physical therapy, still has symptomatic mechanical symptoms, locking, catching, lateral knee pain.      PMH:   Past Medical History:   Diagnosis Date    Asthma     Congenital heart disease     Diabetes mellitus type I     Hypertension     Respiratory distress     Stroke        PSH:   Past Surgical History:   Procedure Laterality Date     SECTION      GALLBLADDER SURGERY      GERD surgery      TONSILLECTOMY         SH:   Social History     Socioeconomic History    Marital status:    Tobacco Use    Smoking status: Never    Smokeless tobacco: Never   Substance and Sexual Activity    Alcohol use: Not Currently    Drug use: Never   Social History Narrative    ** Merged History Encounter **            FH: History reviewed. No pertinent family history.    Allergies:   Review of patient's allergies indicates:   Allergen Reactions    Azithromycin     Bupropion     Choline,magnesium salicylate     Ciprofloxacin     Dulaglutide     Hydrochlorothiazide     Hydromorphone Itching     Other reaction(s): Injection site burning  has had demerol previously with itching, alleviated with benadryl.    Liraglutide     Peanut     Penicillins      Other reaction(s):  "vaginal burning    Sitagliptin     Sulfamethoxazole     Ziprasidone      Other reaction(s): "withdraw"    Cefixime Nausea Only       ROS:  Constitutional- no fever, fatigue, weakness  Eye- no vision loss, eye redness, drainage, or pain  ENMT- no sore throat, ear pain, sinus pain/congestion, nasal congestion/drainage  Respiratory- no cough, wheezing, or shortness of breath  CV- no chest pain, no palpitations, no edema  GI- no N/V/D; no abdominal pain    Physical Exam:  Vitals:    01/04/23 1152   BP: 91/68   Pulse: 105       General: NAD  Cardio: RRR by peripheral pulse  Pulm: Normal WOB on room air, symmetric chest rise  Abd: Soft, NT/ND    MSK:  No significant effusion   Some tenderness along the lateral compartment   Knee range of motion 0-115; some pain with flexion   No varus or valgus instability  Firm endpoint with Lachman testing   Equivocal Lupe  Negative anterior-posterior drawer   Sensation intact to light touch distally   DP palpable    Imaging:   Radiographs and MRI of the right knee independently reviewed demonstrate some osteochondral damage to lateral compartment of the knee; there is evidence of bony infarcts both in the femur and the tibia; evidence of a complex lateral meniscal tear    Assessment:  32-year-old female chronic right knee pain with lateral meniscal tear as well as underlying osteochondral manage    -we had a long discussion about this patient's right knee pain, mechanical symptoms, and the fact that she has now failed conservative treatment including over-the-counter anti-inflammatories, injections, full course of physical therapy  -MRI shows a complex lateral meniscus tear and parameniscal cyst  -I think it is reasonable to consider an arthroscopic right knee surgery to debride this meniscus tear versus possible repair and decompression of the cyst   -at this point, she wants to have surgery but is not yet ready due to some family issues she is having, she will come back in " February if she wishes to have this addressed surgically  -okay to continue weight-bearing as tolerated, she will continue physical therapy      Trupti Santos DO  [facility] Orthopaedic Surgery  1/4/2023 3:08 PM

## 2023-01-17 NOTE — PROGRESS NOTES
Faculty Attestation: Katelyn Lopez  was seen at [facility] in the Orthopaedic Clinic. Discussed with the resident at the time of the visit. History of Present Illness, Physical Exam, and Assessment and Plan reviewed. Treatment plan is reasonable and appropriate. Compliance with treatment recommendations is important. Discussed with the resident at the time of the visit.  No procedure was performed.     Barrie Corona MD MD  Orthopaedic Surgery

## 2023-02-27 DIAGNOSIS — R30.0 DYSURIA: Primary | ICD-10-CM

## 2023-02-27 DIAGNOSIS — N39.0 UTI (URINARY TRACT INFECTION): ICD-10-CM

## 2023-03-22 ENCOUNTER — OFFICE VISIT (OUTPATIENT)
Dept: ORTHOPEDICS | Facility: CLINIC | Age: 33
End: 2023-03-22
Payer: MEDICAID

## 2023-03-22 VITALS — BODY MASS INDEX: 50.85 KG/M2 | WEIGHT: 287 LBS | HEIGHT: 63 IN

## 2023-03-22 DIAGNOSIS — M25.861 CYST OF RIGHT KNEE JOINT: ICD-10-CM

## 2023-03-22 DIAGNOSIS — S83.271D COMPLEX TEAR OF LATERAL MENISCUS OF RIGHT KNEE AS CURRENT INJURY, SUBSEQUENT ENCOUNTER: Primary | ICD-10-CM

## 2023-03-22 PROCEDURE — 1159F MED LIST DOCD IN RCRD: CPT | Mod: CPTII,,, | Performed by: ORTHOPAEDIC SURGERY

## 2023-03-22 PROCEDURE — 3008F PR BODY MASS INDEX (BMI) DOCUMENTED: ICD-10-PCS | Mod: CPTII,,, | Performed by: ORTHOPAEDIC SURGERY

## 2023-03-22 PROCEDURE — 99215 OFFICE O/P EST HI 40 MIN: CPT | Mod: PBBFAC

## 2023-03-22 PROCEDURE — 99213 OFFICE O/P EST LOW 20 MIN: CPT | Mod: S$PBB,,, | Performed by: ORTHOPAEDIC SURGERY

## 2023-03-22 PROCEDURE — 99213 PR OFFICE/OUTPT VISIT, EST, LEVL III, 20-29 MIN: ICD-10-PCS | Mod: S$PBB,,, | Performed by: ORTHOPAEDIC SURGERY

## 2023-03-22 PROCEDURE — 1159F PR MEDICATION LIST DOCUMENTED IN MEDICAL RECORD: ICD-10-PCS | Mod: CPTII,,, | Performed by: ORTHOPAEDIC SURGERY

## 2023-03-22 PROCEDURE — 3008F BODY MASS INDEX DOCD: CPT | Mod: CPTII,,, | Performed by: ORTHOPAEDIC SURGERY

## 2023-03-22 RX ORDER — MUPIROCIN 20 MG/G
OINTMENT TOPICAL
Status: CANCELLED | OUTPATIENT
Start: 2023-03-22

## 2023-03-22 NOTE — PROGRESS NOTES
[facility] Orthopaedic Surgery Clinic Note    In brief, Katelyn Lopez is a 32 y.o. female presenting with right knee pain, chronic right lateral meniscus tear     Patient states that over the past year began to experience right knee pain.  No traumatic inciting event.  Insidious onset.  Worsening over time.  Worse with activity.  Occasional locking however no limitations to motion. Had a knee injection in September that only provided 2-3 days of relief.  Otherwise no other interventions.    Otherwise, no prior trauma.  No surgeries to right lower extremity.  Past medical history consistent with diabetes, hypertension, sickle cell trait, restless leg syndrome and fibromyalgia.  Patient does not smoke use nicotine products.  She has now completed a full course of physical therapy, still has symptomatic mechanical symptoms, locking, catching, lateral knee pain.    She returns today with continued pain mainly lateral along the right knee.      PMH:   Past Medical History:   Diagnosis Date    Asthma     Congenital heart disease     Diabetes mellitus type I     Hypertension     Respiratory distress     Stroke        PSH:   Past Surgical History:   Procedure Laterality Date     SECTION      GALLBLADDER SURGERY      GERD surgery      TONSILLECTOMY         SH:   Social History     Socioeconomic History    Marital status:    Tobacco Use    Smoking status: Never    Smokeless tobacco: Never   Substance and Sexual Activity    Alcohol use: Not Currently    Drug use: Never   Social History Narrative    ** Merged History Encounter **            FH: No family history on file.    Allergies:   Review of patient's allergies indicates:   Allergen Reactions    Azithromycin     Bupropion     Choline,magnesium salicylate     Ciprofloxacin     Dulaglutide     Hydrochlorothiazide     Hydromorphone Itching     Other reaction(s): Injection site burning  has had demerol previously with itching, alleviated with benadryl.    Liraglutide  "    Peanut     Penicillins      Other reaction(s): vaginal burning    Sitagliptin     Sulfamethoxazole     Ziprasidone      Other reaction(s): "withdraw"    Cefixime Nausea Only       ROS:  Constitutional- no fever, fatigue, weakness  Eye- no vision loss, eye redness, drainage, or pain  ENMT- no sore throat, ear pain, sinus pain/congestion, nasal congestion/drainage  Respiratory- no cough, wheezing, or shortness of breath  CV- no chest pain, no palpitations, no edema  GI- no N/V/D; no abdominal pain    Physical Exam:  There were no vitals filed for this visit.      General: NAD  Cardio: RRR by peripheral pulse  Pulm: Normal WOB on room air, symmetric chest rise  Abd: Soft, NT/ND    MSK:  No significant effusion   Some tenderness along the lateral compartment   Knee range of motion 0-115; some pain with flexion   No varus or valgus instability  Firm endpoint with Lachman testing   Equivocal Lupe  Negative anterior-posterior drawer   Sensation intact to light touch distally   DP palpable    Imaging:   Radiographs and MRI of the right knee independently reviewed demonstrate some osteochondral damage to lateral compartment of the knee; there is evidence of bony infarcts both in the femur and the tibia; evidence of a complex lateral meniscal tear    Assessment:  32-year-old female chronic right knee pain with lateral meniscal tear     I have recommended surgical intervention: Right knee arthroscopic partial lateral meniscectomy, intracondylar notch cyst debridement.  We discussed the details of the procedure and expected postoperative course.  We discussed the benefits of surgery which be to decrease her pain and increase function.  We discussed the risks of surgery which is small but could be significant if she is continued pain, stiffness, or infection.  After discussion she would like to proceed.  Plans for surgery April 11          Hans Berger Jr, MD  [facility] Orthopaedic Surgery  3/22/2023 3:08 PM              "

## 2023-03-29 ENCOUNTER — OFFICE VISIT (OUTPATIENT)
Dept: UROLOGY | Facility: CLINIC | Age: 33
End: 2023-03-29
Payer: MEDICAID

## 2023-03-29 VITALS
SYSTOLIC BLOOD PRESSURE: 111 MMHG | HEIGHT: 63 IN | BODY MASS INDEX: 51.91 KG/M2 | HEART RATE: 105 BPM | RESPIRATION RATE: 18 BRPM | DIASTOLIC BLOOD PRESSURE: 83 MMHG | OXYGEN SATURATION: 100 % | WEIGHT: 293 LBS

## 2023-03-29 DIAGNOSIS — N39.3 STRESS INCONTINENCE: ICD-10-CM

## 2023-03-29 DIAGNOSIS — N39.0 RECURRENT URINARY TRACT INFECTION: ICD-10-CM

## 2023-03-29 DIAGNOSIS — N39.41 URGE INCONTINENCE: ICD-10-CM

## 2023-03-29 DIAGNOSIS — R30.0 DYSURIA: Primary | ICD-10-CM

## 2023-03-29 PROCEDURE — 3079F DIAST BP 80-89 MM HG: CPT | Mod: CPTII,,, | Performed by: UROLOGY

## 2023-03-29 PROCEDURE — 3074F PR MOST RECENT SYSTOLIC BLOOD PRESSURE < 130 MM HG: ICD-10-PCS | Mod: CPTII,,, | Performed by: UROLOGY

## 2023-03-29 PROCEDURE — 3008F PR BODY MASS INDEX (BMI) DOCUMENTED: ICD-10-PCS | Mod: CPTII,,, | Performed by: UROLOGY

## 2023-03-29 PROCEDURE — 3079F PR MOST RECENT DIASTOLIC BLOOD PRESSURE 80-89 MM HG: ICD-10-PCS | Mod: CPTII,,, | Performed by: UROLOGY

## 2023-03-29 PROCEDURE — 3008F BODY MASS INDEX DOCD: CPT | Mod: CPTII,,, | Performed by: UROLOGY

## 2023-03-29 PROCEDURE — 1159F MED LIST DOCD IN RCRD: CPT | Mod: CPTII,,, | Performed by: UROLOGY

## 2023-03-29 PROCEDURE — 87088 URINE BACTERIA CULTURE: CPT | Performed by: UROLOGY

## 2023-03-29 PROCEDURE — 99204 OFFICE O/P NEW MOD 45 MIN: CPT | Mod: S$PBB,,, | Performed by: UROLOGY

## 2023-03-29 PROCEDURE — 1160F PR REVIEW ALL MEDS BY PRESCRIBER/CLIN PHARMACIST DOCUMENTED: ICD-10-PCS | Mod: CPTII,,, | Performed by: UROLOGY

## 2023-03-29 PROCEDURE — 99204 PR OFFICE/OUTPT VISIT, NEW, LEVL IV, 45-59 MIN: ICD-10-PCS | Mod: S$PBB,,, | Performed by: UROLOGY

## 2023-03-29 PROCEDURE — 99215 OFFICE O/P EST HI 40 MIN: CPT | Mod: PBBFAC | Performed by: UROLOGY

## 2023-03-29 PROCEDURE — 3074F SYST BP LT 130 MM HG: CPT | Mod: CPTII,,, | Performed by: UROLOGY

## 2023-03-29 PROCEDURE — 1160F RVW MEDS BY RX/DR IN RCRD: CPT | Mod: CPTII,,, | Performed by: UROLOGY

## 2023-03-29 PROCEDURE — 1159F PR MEDICATION LIST DOCUMENTED IN MEDICAL RECORD: ICD-10-PCS | Mod: CPTII,,, | Performed by: UROLOGY

## 2023-03-29 RX ORDER — CEFDINIR 300 MG/1
300 CAPSULE ORAL 2 TIMES DAILY
Qty: 14 CAPSULE | Refills: 0 | Status: SHIPPED | OUTPATIENT
Start: 2023-03-29 | End: 2023-04-05

## 2023-03-29 NOTE — PROGRESS NOTES
Pt seen by Dr. Cortez. RTC 4 weeks with bladder scan. New medications will be sent to pharmacy. Pt education given both written and verbal.

## 2023-03-29 NOTE — PROGRESS NOTES
CC:  Recurrent urinary tract infections    HPI:  Katelyn Lopez is a 32 y.o. female seen in consultation for recurrent urinary tract infections.  She has had problems with recurrent urinary tract infections over the last year.  She is treated with Macrobid but says it that is not relieve symptoms.  Typical symptoms are dysuria, urinary frequency, and suprapubic pressure.  She was last treated about a month ago but says the symptoms did not improve.  She is diabetic.    Over the same timeframe she has been having problems with urgency and urge incontinence.  She was given oxybutynin but said that that made it impossible for her urinate and cause swelling so she stopped taking it.  She gets the urge and will have very little time to get to the bathroom before she has incontinence.  She does feel like when she urinates that the bladder empties.  She has occasional stress urinary incontinence with sneezing but this is not as bothersome as the urgency incontinence.  She has had dysuria and foul odor to the urine present for one to two days.      Data Review:  Note from referring provider, HILLARY Eagle-ODALIS dated 2023.     ROS:  All systems reviewed and are negative except as documented in HPI and/or Assessment and Plan.     Patient Active Problem List:     Patient Active Problem List   Diagnosis    Right knee pain    Difficulty walking    Internal derangement of right knee    BMI 50.0-59.9, adult    Tear of lateral meniscus of right knee, current        Past Medical History:  Past Medical History:   Diagnosis Date    Asthma     Congenital heart disease     Diabetes mellitus type I     Hypertension     Respiratory distress     Stroke         Past Surgical History:  Past Surgical History:   Procedure Laterality Date     SECTION      GALLBLADDER SURGERY      GERD surgery      TONSILLECTOMY          Family History:  History reviewed. No pertinent family history.     Social History:  Social History  "    Socioeconomic History    Marital status:    Tobacco Use    Smoking status: Never    Smokeless tobacco: Never   Substance and Sexual Activity    Alcohol use: Not Currently    Drug use: Never   Social History Narrative    ** Merged History Encounter **             Allergies:  Review of patient's allergies indicates:   Allergen Reactions    Azithromycin     Bupropion     Choline,magnesium salicylate     Ciprofloxacin     Dulaglutide     Hydrochlorothiazide     Hydromorphone Itching     Other reaction(s): Injection site burning  has had demerol previously with itching, alleviated with benadryl.    Liraglutide     Peanut     Penicillins      Other reaction(s): vaginal burning    Sitagliptin     Sulfamethoxazole     Ziprasidone      Other reaction(s): "withdraw"    Cefixime Nausea Only        Objective:  Vitals:    03/29/23 1354   BP: 111/83   Pulse: 105   Resp: 18     General:  Well developed, well nourished adult female in no acute distress  Abdomen: Soft, nontender, no masses  Extremities:  No clubbing, cyanosis, or edema  Neurologic:  Grossly intact  Musculoskeletal:  Normal tone    Assessment:  1. Recurrent urinary tract infection  - Ambulatory referral/consult to Urology    2. Dysuria  - Urine culture  - cefdinir (OMNICEF) 300 MG capsule; Take 1 capsule (300 mg total) by mouth 2 (two) times daily. for 7 days  Dispense: 14 capsule; Refill: 0    3. Urge incontinence  - Ambulatory referral/consult to Urology  - mirabegron (MYRBETRIQ) 50 mg Tb24; Take 1 tablet (50 mg total) by mouth once daily.  Dispense: 90 tablet; Refill: 3    4. Stress incontinence     Plan:  Hold off on any specific treatment for this until we get the incontinence under control and see if this helped decrease the urinary tract infection.  She may need a CT scan, cystoscopy, or antibiotic suppression.  Urine culture sent today.  She was given cefdinir.  She is allergic to a fourth generation cephalosporins but the reaction is nausea " only.  She can not take oxybutynin due to the effects it had on her how we will put her on Myrbetriq.  Observation as this does not appear to be a significant issue at this time.    Follow-up:    Four weeks with bladder scan.

## 2023-03-31 LAB — BACTERIA UR CULT: NO GROWTH

## 2023-03-31 RX ORDER — LUBIPROSTONE 24 UG/1
24 CAPSULE ORAL 2 TIMES DAILY
COMMUNITY

## 2023-03-31 RX ORDER — INSULIN ASPART INJECTION 100 [IU]/ML
60 INJECTION, SOLUTION SUBCUTANEOUS
COMMUNITY
Start: 2023-03-13

## 2023-03-31 RX ORDER — LAMOTRIGINE 25 MG/1
25 TABLET ORAL
COMMUNITY
Start: 2023-03-20

## 2023-03-31 RX ORDER — INSULIN DEGLUDEC 200 U/ML
100 INJECTION, SOLUTION SUBCUTANEOUS DAILY
COMMUNITY
Start: 2023-03-21

## 2023-03-31 RX ORDER — TIRZEPATIDE 5 MG/.5ML
INJECTION, SOLUTION SUBCUTANEOUS
COMMUNITY
Start: 2023-03-17

## 2023-04-07 ENCOUNTER — ANESTHESIA EVENT (OUTPATIENT)
Dept: SURGERY | Facility: HOSPITAL | Age: 33
End: 2023-04-07
Payer: MEDICAID

## 2023-04-07 NOTE — ANESTHESIA PREPROCEDURE EVALUATION
"                                                                                                             04/07/2023  Katelyn Lopez is a 32 y.o., female with HTN, DM, TIA on ASA, congenital heart dz - denies, asthma well controlled with qd inhalers, and super MO  for right knee ATS and lateral meniscus repair.  Relates that was told by cardiologist that she has a "weakened and thickened" heart by ECHO about a year ago. Saw cardiology for tachycardia, placed on B blk, controlled per pt. Pt does her own housework without SOB and can climb one flight of stairs stopping once due to SOB    Pre-op Assessment    I have reviewed the Patient Summary Reports.     I have reviewed the Nursing Notes. I have reviewed the NPO Status.   I have reviewed the Medications.     Review of Systems  Anesthesia Hx:  No problems with previous Anesthesia  Denies Family Hx of Anesthesia complications.   Denies Personal Hx of Anesthesia complications.   Hematology/Oncology:  Hematology Normal   Oncology Normal     EENT/Dental:  EENT/Dental Normal  Ears General/Symptom(s) Ear Disease: Tympanic Membrane Problem   Cardiovascular:  Cardiovascular Normal     Pulmonary:  Pulmonary Normal    Renal/:  Renal/ Normal     Hepatic/GI:  Hepatic/GI Normal    Musculoskeletal:  Musculoskeletal Normal    Neurological:  Neurology Normal    Endocrine:  Endocrine Normal    Dermatological:  Skin Normal    Psych:  Psychiatric Normal           Physical Exam  General: Well nourished    Airway:  Mallampati: III   Mouth Opening: Normal  TM Distance: Normal  Tongue: Normal  Neck ROM: Normal ROM    Dental:  Intact    Chest/Lungs:  Clear to auscultation, Normal Respiratory Rate    Heart:  Rate: Normal  Rhythm: Regular Rhythm        Anesthesia Plan  Type of Anesthesia, risks & benefits discussed:    Anesthesia Type: Gen ETT  Intra-op Monitoring Plan: Standard ASA Monitors  Post Op Pain Control Plan: multimodal analgesia, IV/PO Opioids PRN and peripheral nerve " block  Induction:  IV  Airway Plan: Direct  Informed Consent: Informed consent signed with the Patient and all parties understand the risks and agree with anesthesia plan.  All questions answered.   ASA Score: 3  Day of Surgery Review of History & Physical: H&P Update referred to the surgeon/provider.I have interviewed and examined the patient. I have reviewed the patient's H&P dated: There are no significant changes. H&P completed by Anesthesiologist.    Ready For Surgery From Anesthesia Perspective.     .

## 2023-04-10 RX ORDER — MELOXICAM 15 MG/1
15 TABLET ORAL DAILY
Qty: 30 TABLET | Refills: 1 | Status: SHIPPED | OUTPATIENT
Start: 2023-04-10

## 2023-04-10 RX ORDER — ONDANSETRON 4 MG/1
4 TABLET, FILM COATED ORAL 2 TIMES DAILY
Qty: 10 TABLET | Refills: 0 | Status: SHIPPED | OUTPATIENT
Start: 2023-04-10

## 2023-04-10 RX ORDER — OXYCODONE AND ACETAMINOPHEN 5; 325 MG/1; MG/1
1 TABLET ORAL EVERY 4 HOURS PRN
Qty: 28 TABLET | Refills: 0 | Status: SHIPPED | OUTPATIENT
Start: 2023-04-10

## 2023-04-11 ENCOUNTER — ANESTHESIA (OUTPATIENT)
Dept: SURGERY | Facility: HOSPITAL | Age: 33
End: 2023-04-11
Payer: MEDICAID

## 2023-04-11 ENCOUNTER — HOSPITAL ENCOUNTER (OUTPATIENT)
Facility: HOSPITAL | Age: 33
Discharge: HOME | End: 2023-04-11
Attending: ORTHOPAEDIC SURGERY | Admitting: SPECIALIST
Payer: MEDICAID

## 2023-04-11 DIAGNOSIS — S83.271D COMPLEX TEAR OF LATERAL MENISCUS OF RIGHT KNEE AS CURRENT INJURY, SUBSEQUENT ENCOUNTER: ICD-10-CM

## 2023-04-11 LAB
B-HCG UR QL: NEGATIVE
CTP QC/QA: YES
POCT GLUCOSE: 197 MG/DL (ref 70–110)
POCT GLUCOSE: 197 MG/DL (ref 70–110)

## 2023-04-11 PROCEDURE — 29881 ARTHRS KNE SRG MNISECTMY M/L: CPT | Mod: RT,,, | Performed by: SPECIALIST

## 2023-04-11 PROCEDURE — 27201423 OPTIME MED/SURG SUP & DEVICES STERILE SUPPLY: Performed by: SPECIALIST

## 2023-04-11 PROCEDURE — 63600175 PHARM REV CODE 636 W HCPCS: Performed by: NURSE ANESTHETIST, CERTIFIED REGISTERED

## 2023-04-11 PROCEDURE — 63600175 PHARM REV CODE 636 W HCPCS

## 2023-04-11 PROCEDURE — 29881 PR KNEE SCOPE SINGLE MENISECECTOMY: ICD-10-PCS | Mod: RT,,, | Performed by: SPECIALIST

## 2023-04-11 PROCEDURE — 36000710: Performed by: SPECIALIST

## 2023-04-11 PROCEDURE — 81025 URINE PREGNANCY TEST: CPT | Performed by: ANESTHESIOLOGY

## 2023-04-11 PROCEDURE — 63600175 PHARM REV CODE 636 W HCPCS: Performed by: ANESTHESIOLOGY

## 2023-04-11 PROCEDURE — 64447 NJX AA&/STRD FEMORAL NRV IMG: CPT | Performed by: ANESTHESIOLOGY

## 2023-04-11 PROCEDURE — 25000003 PHARM REV CODE 250: Performed by: NURSE ANESTHETIST, CERTIFIED REGISTERED

## 2023-04-11 PROCEDURE — 01400 ANES OPN/ARTHRS KNEE JT NOS: CPT | Performed by: SPECIALIST

## 2023-04-11 PROCEDURE — 71000039 HC RECOVERY, EACH ADD'L HOUR: Performed by: SPECIALIST

## 2023-04-11 PROCEDURE — D9220A PRA ANESTHESIA: ICD-10-PCS | Mod: ANES,,, | Performed by: ANESTHESIOLOGY

## 2023-04-11 PROCEDURE — 94640 AIRWAY INHALATION TREATMENT: CPT

## 2023-04-11 PROCEDURE — 71000015 HC POSTOP RECOV 1ST HR: Performed by: SPECIALIST

## 2023-04-11 PROCEDURE — 63600175 PHARM REV CODE 636 W HCPCS: Performed by: SPECIALIST

## 2023-04-11 PROCEDURE — 25000242 PHARM REV CODE 250 ALT 637 W/ HCPCS: Performed by: ANESTHESIOLOGY

## 2023-04-11 PROCEDURE — D9220A PRA ANESTHESIA: Mod: ANES,,, | Performed by: ANESTHESIOLOGY

## 2023-04-11 PROCEDURE — 94760 N-INVAS EAR/PLS OXIMETRY 1: CPT

## 2023-04-11 PROCEDURE — 36000711: Performed by: SPECIALIST

## 2023-04-11 PROCEDURE — 71000016 HC POSTOP RECOV ADDL HR: Performed by: SPECIALIST

## 2023-04-11 PROCEDURE — 37000008 HC ANESTHESIA 1ST 15 MINUTES: Performed by: SPECIALIST

## 2023-04-11 PROCEDURE — 64447 PERIPHERAL BLOCK: ICD-10-PCS | Mod: 59,RT,, | Performed by: ANESTHESIOLOGY

## 2023-04-11 PROCEDURE — D9220A PRA ANESTHESIA: ICD-10-PCS | Mod: CRNA,,, | Performed by: NURSE ANESTHETIST, CERTIFIED REGISTERED

## 2023-04-11 PROCEDURE — D9220A PRA ANESTHESIA: Mod: CRNA,,, | Performed by: NURSE ANESTHETIST, CERTIFIED REGISTERED

## 2023-04-11 PROCEDURE — 71000033 HC RECOVERY, INTIAL HOUR: Performed by: SPECIALIST

## 2023-04-11 PROCEDURE — 37000009 HC ANESTHESIA EA ADD 15 MINS: Performed by: SPECIALIST

## 2023-04-11 RX ORDER — LIDOCAINE HYDROCHLORIDE 20 MG/ML
INJECTION INTRAVENOUS
Status: DISCONTINUED | OUTPATIENT
Start: 2023-04-11 | End: 2023-04-11

## 2023-04-11 RX ORDER — IPRATROPIUM BROMIDE AND ALBUTEROL SULFATE 2.5; .5 MG/3ML; MG/3ML
3 SOLUTION RESPIRATORY (INHALATION) ONCE
Status: DISCONTINUED | OUTPATIENT
Start: 2023-04-11 | End: 2023-04-11

## 2023-04-11 RX ORDER — OXYCODONE HYDROCHLORIDE 5 MG/1
5 TABLET ORAL
Status: ACTIVE | OUTPATIENT
Start: 2023-04-11

## 2023-04-11 RX ORDER — EPINEPHRINE 1 MG/ML
INJECTION INTRAMUSCULAR; INTRAVENOUS; SUBCUTANEOUS
Status: DISCONTINUED
Start: 2023-04-11 | End: 2023-04-11 | Stop reason: HOSPADM

## 2023-04-11 RX ORDER — LEVALBUTEROL INHALATION SOLUTION 1.25 MG/3ML
1.25 SOLUTION RESPIRATORY (INHALATION) ONCE
Status: COMPLETED | OUTPATIENT
Start: 2023-04-11 | End: 2023-04-11

## 2023-04-11 RX ORDER — FENTANYL CITRATE 50 UG/ML
INJECTION, SOLUTION INTRAMUSCULAR; INTRAVENOUS
Status: DISCONTINUED | OUTPATIENT
Start: 2023-04-11 | End: 2023-04-11

## 2023-04-11 RX ORDER — MORPHINE SULFATE 2 MG/ML
INJECTION, SOLUTION INTRAMUSCULAR; INTRAVENOUS
Status: COMPLETED
Start: 2023-04-11 | End: 2023-04-11

## 2023-04-11 RX ORDER — DIPHENHYDRAMINE HYDROCHLORIDE 50 MG/ML
INJECTION INTRAMUSCULAR; INTRAVENOUS
Status: COMPLETED
Start: 2023-04-11 | End: 2023-04-11

## 2023-04-11 RX ORDER — INSULIN ASPART 100 [IU]/ML
4-12 INJECTION, SOLUTION INTRAVENOUS; SUBCUTANEOUS
Status: CANCELLED | OUTPATIENT
Start: 2023-04-11

## 2023-04-11 RX ORDER — NEOSTIGMINE METHYLSULFATE 1 MG/ML
INJECTION, SOLUTION INTRAVENOUS
Status: DISCONTINUED | OUTPATIENT
Start: 2023-04-11 | End: 2023-04-11

## 2023-04-11 RX ORDER — CEFAZOLIN SODIUM 1 G/3ML
2 INJECTION, POWDER, FOR SOLUTION INTRAMUSCULAR; INTRAVENOUS
Status: DISCONTINUED | OUTPATIENT
Start: 2023-04-11 | End: 2023-04-11 | Stop reason: HOSPADM

## 2023-04-11 RX ORDER — KETOROLAC TROMETHAMINE 30 MG/ML
INJECTION, SOLUTION INTRAMUSCULAR; INTRAVENOUS
Status: COMPLETED
Start: 2023-04-11 | End: 2023-04-11

## 2023-04-11 RX ORDER — DEXAMETHASONE SODIUM PHOSPHATE 4 MG/ML
INJECTION, SOLUTION INTRA-ARTICULAR; INTRALESIONAL; INTRAMUSCULAR; INTRAVENOUS; SOFT TISSUE
Status: DISCONTINUED | OUTPATIENT
Start: 2023-04-11 | End: 2023-04-11

## 2023-04-11 RX ORDER — METOCLOPRAMIDE HYDROCHLORIDE 5 MG/ML
10 INJECTION INTRAMUSCULAR; INTRAVENOUS ONCE AS NEEDED
Status: ACTIVE | OUTPATIENT
Start: 2023-04-11 | End: 2034-09-07

## 2023-04-11 RX ORDER — EPINEPHRINE 1 MG/ML
INJECTION INTRAMUSCULAR; INTRAVENOUS; SUBCUTANEOUS
Status: DISCONTINUED | OUTPATIENT
Start: 2023-04-11 | End: 2023-04-11 | Stop reason: HOSPADM

## 2023-04-11 RX ORDER — DIPHENHYDRAMINE HYDROCHLORIDE 50 MG/ML
6.25 INJECTION INTRAMUSCULAR; INTRAVENOUS ONCE AS NEEDED
Status: COMPLETED | OUTPATIENT
Start: 2023-04-11 | End: 2023-04-11

## 2023-04-11 RX ORDER — MORPHINE SULFATE 2 MG/ML
1 INJECTION, SOLUTION INTRAMUSCULAR; INTRAVENOUS
Status: DISCONTINUED | OUTPATIENT
Start: 2023-04-11 | End: 2023-04-11

## 2023-04-11 RX ORDER — ACETAMINOPHEN 10 MG/ML
INJECTION, SOLUTION INTRAVENOUS
Status: COMPLETED
Start: 2023-04-11 | End: 2023-04-11

## 2023-04-11 RX ORDER — LIDOCAINE HYDROCHLORIDE 10 MG/ML
1 INJECTION, SOLUTION EPIDURAL; INFILTRATION; INTRACAUDAL; PERINEURAL ONCE
Status: ACTIVE | OUTPATIENT
Start: 2023-04-11

## 2023-04-11 RX ORDER — SODIUM CHLORIDE, SODIUM LACTATE, POTASSIUM CHLORIDE, CALCIUM CHLORIDE 600; 310; 30; 20 MG/100ML; MG/100ML; MG/100ML; MG/100ML
1000 INJECTION, SOLUTION INTRAVENOUS CONTINUOUS
Status: CANCELLED | OUTPATIENT
Start: 2023-04-11

## 2023-04-11 RX ORDER — ROPIVACAINE HYDROCHLORIDE 5 MG/ML
INJECTION, SOLUTION EPIDURAL; INFILTRATION; PERINEURAL
Status: COMPLETED | OUTPATIENT
Start: 2023-04-11 | End: 2023-04-11

## 2023-04-11 RX ORDER — MIDAZOLAM HYDROCHLORIDE 1 MG/ML
INJECTION INTRAMUSCULAR; INTRAVENOUS
Status: COMPLETED
Start: 2023-04-11 | End: 2023-04-11

## 2023-04-11 RX ORDER — ACETAMINOPHEN 10 MG/ML
1000 INJECTION, SOLUTION INTRAVENOUS ONCE
Status: COMPLETED | OUTPATIENT
Start: 2023-04-11 | End: 2023-04-11

## 2023-04-11 RX ORDER — SUCCINYLCHOLINE CHLORIDE 20 MG/ML
INJECTION INTRAMUSCULAR; INTRAVENOUS
Status: DISCONTINUED | OUTPATIENT
Start: 2023-04-11 | End: 2023-04-11

## 2023-04-11 RX ORDER — DIPHENHYDRAMINE HYDROCHLORIDE 50 MG/ML
25 INJECTION INTRAMUSCULAR; INTRAVENOUS ONCE
Status: CANCELLED | OUTPATIENT
Start: 2023-04-11

## 2023-04-11 RX ORDER — CEFAZOLIN SODIUM 1 G/3ML
INJECTION, POWDER, FOR SOLUTION INTRAMUSCULAR; INTRAVENOUS
Status: DISCONTINUED | OUTPATIENT
Start: 2023-04-11 | End: 2023-04-11

## 2023-04-11 RX ORDER — EPINEPHRINE 1 MG/ML
INJECTION, SOLUTION, CONCENTRATE INTRAVENOUS
Status: DISCONTINUED
Start: 2023-04-11 | End: 2023-04-11 | Stop reason: HOSPADM

## 2023-04-11 RX ORDER — MUPIROCIN 20 MG/G
OINTMENT TOPICAL
Status: DISCONTINUED | OUTPATIENT
Start: 2023-04-11 | End: 2023-04-11 | Stop reason: HOSPADM

## 2023-04-11 RX ORDER — IPRATROPIUM BROMIDE 0.5 MG/2.5ML
0.5 SOLUTION RESPIRATORY (INHALATION) ONCE
Status: COMPLETED | OUTPATIENT
Start: 2023-04-11 | End: 2023-04-11

## 2023-04-11 RX ORDER — KETOROLAC TROMETHAMINE 30 MG/ML
30 INJECTION, SOLUTION INTRAMUSCULAR; INTRAVENOUS ONCE
Status: COMPLETED | OUTPATIENT
Start: 2023-04-11 | End: 2023-04-11

## 2023-04-11 RX ORDER — SODIUM CHLORIDE, SODIUM LACTATE, POTASSIUM CHLORIDE, CALCIUM CHLORIDE 600; 310; 30; 20 MG/100ML; MG/100ML; MG/100ML; MG/100ML
INJECTION, SOLUTION INTRAVENOUS CONTINUOUS
Status: ACTIVE | OUTPATIENT
Start: 2023-04-11

## 2023-04-11 RX ORDER — ROPIVACAINE HYDROCHLORIDE 5 MG/ML
INJECTION, SOLUTION EPIDURAL; INFILTRATION; PERINEURAL
Status: COMPLETED
Start: 2023-04-11 | End: 2023-04-11

## 2023-04-11 RX ORDER — GLYCOPYRROLATE 0.2 MG/ML
INJECTION INTRAMUSCULAR; INTRAVENOUS
Status: DISCONTINUED | OUTPATIENT
Start: 2023-04-11 | End: 2023-04-11

## 2023-04-11 RX ORDER — PHENYLEPHRINE HYDROCHLORIDE 10 MG/ML
INJECTION INTRAVENOUS
Status: DISCONTINUED | OUTPATIENT
Start: 2023-04-11 | End: 2023-04-11

## 2023-04-11 RX ORDER — PROPOFOL 10 MG/ML
VIAL (ML) INTRAVENOUS
Status: DISCONTINUED | OUTPATIENT
Start: 2023-04-11 | End: 2023-04-11

## 2023-04-11 RX ORDER — ROPIVACAINE HYDROCHLORIDE 5 MG/ML
INJECTION, SOLUTION EPIDURAL; INFILTRATION; PERINEURAL
Status: DISCONTINUED
Start: 2023-04-11 | End: 2023-04-11 | Stop reason: HOSPADM

## 2023-04-11 RX ORDER — DROPERIDOL 2.5 MG/ML
0.25 INJECTION, SOLUTION INTRAMUSCULAR; INTRAVENOUS ONCE AS NEEDED
Status: ACTIVE | OUTPATIENT
Start: 2023-04-11 | End: 2034-09-07

## 2023-04-11 RX ORDER — ROCURONIUM BROMIDE 10 MG/ML
INJECTION, SOLUTION INTRAVENOUS
Status: DISCONTINUED | OUTPATIENT
Start: 2023-04-11 | End: 2023-04-11

## 2023-04-11 RX ORDER — CLONAZEPAM 0.5 MG/1
0.5 TABLET ORAL 2 TIMES DAILY PRN
COMMUNITY

## 2023-04-11 RX ORDER — INSULIN ASPART 100 [IU]/ML
2-9 INJECTION, SOLUTION INTRAVENOUS; SUBCUTANEOUS
Status: CANCELLED | OUTPATIENT
Start: 2023-04-11

## 2023-04-11 RX ORDER — ONDANSETRON 2 MG/ML
INJECTION INTRAMUSCULAR; INTRAVENOUS
Status: DISCONTINUED | OUTPATIENT
Start: 2023-04-11 | End: 2023-04-11

## 2023-04-11 RX ORDER — KETOROLAC TROMETHAMINE 30 MG/ML
INJECTION, SOLUTION INTRAMUSCULAR; INTRAVENOUS
Status: DISCONTINUED | OUTPATIENT
Start: 2023-04-11 | End: 2023-04-11

## 2023-04-11 RX ORDER — MIDAZOLAM HYDROCHLORIDE 1 MG/ML
1 INJECTION INTRAMUSCULAR; INTRAVENOUS ONCE AS NEEDED
Status: COMPLETED | OUTPATIENT
Start: 2023-04-11 | End: 2023-04-11

## 2023-04-11 RX ADMIN — KETOROLAC TROMETHAMINE 30 MG: 30 INJECTION, SOLUTION INTRAMUSCULAR at 10:04

## 2023-04-11 RX ADMIN — SODIUM CHLORIDE, POTASSIUM CHLORIDE, SODIUM LACTATE AND CALCIUM CHLORIDE: 600; 310; 30; 20 INJECTION, SOLUTION INTRAVENOUS at 07:04

## 2023-04-11 RX ADMIN — MORPHINE SULFATE 1 MG: 2 INJECTION, SOLUTION INTRAMUSCULAR; INTRAVENOUS at 11:04

## 2023-04-11 RX ADMIN — GLYCOPYRROLATE 0.6 MG: 0.2 INJECTION INTRAMUSCULAR; INTRAVENOUS at 09:04

## 2023-04-11 RX ADMIN — DIPHENHYDRAMINE HYDROCHLORIDE 6.25 MG: 50 INJECTION INTRAMUSCULAR; INTRAVENOUS at 10:04

## 2023-04-11 RX ADMIN — ROCURONIUM BROMIDE 5 MG: 10 SOLUTION INTRAVENOUS at 08:04

## 2023-04-11 RX ADMIN — ONDANSETRON 4 MG: 2 INJECTION INTRAMUSCULAR; INTRAVENOUS at 08:04

## 2023-04-11 RX ADMIN — KETOROLAC TROMETHAMINE 30 MG: 30 INJECTION, SOLUTION INTRAMUSCULAR; INTRAVENOUS at 08:04

## 2023-04-11 RX ADMIN — ROPIVACAINE HYDROCHLORIDE 20 ML: 5 INJECTION, SOLUTION EPIDURAL; INFILTRATION; PERINEURAL at 08:04

## 2023-04-11 RX ADMIN — PHENYLEPHRINE HYDROCHLORIDE 200 MCG: 10 INJECTION INTRAVENOUS at 09:04

## 2023-04-11 RX ADMIN — FENTANYL CITRATE 50 MCG: 50 INJECTION, SOLUTION INTRAMUSCULAR; INTRAVENOUS at 08:04

## 2023-04-11 RX ADMIN — ROCURONIUM BROMIDE 30 MG: 10 SOLUTION INTRAVENOUS at 08:04

## 2023-04-11 RX ADMIN — CEFAZOLIN 3 G: 330 INJECTION, POWDER, FOR SOLUTION INTRAMUSCULAR; INTRAVENOUS at 08:04

## 2023-04-11 RX ADMIN — ACETAMINOPHEN 1000 MG: 10 INJECTION, SOLUTION INTRAVENOUS at 10:04

## 2023-04-11 RX ADMIN — KETOROLAC TROMETHAMINE 30 MG: 30 INJECTION, SOLUTION INTRAMUSCULAR; INTRAVENOUS at 10:04

## 2023-04-11 RX ADMIN — IPRATROPIUM BROMIDE 0.5 MG: 0.5 SOLUTION RESPIRATORY (INHALATION) at 05:04

## 2023-04-11 RX ADMIN — DEXAMETHASONE SODIUM PHOSPHATE 8 MG: 4 INJECTION, SOLUTION INTRA-ARTICULAR; INTRALESIONAL; INTRAMUSCULAR; INTRAVENOUS; SOFT TISSUE at 08:04

## 2023-04-11 RX ADMIN — MIDAZOLAM HYDROCHLORIDE 1 MG: 1 INJECTION, SOLUTION INTRAMUSCULAR; INTRAVENOUS at 07:04

## 2023-04-11 RX ADMIN — NEOSTIGMINE METHYLSULFATE 5 MG: 1 INJECTION INTRAVENOUS at 09:04

## 2023-04-11 RX ADMIN — SODIUM CHLORIDE, POTASSIUM CHLORIDE, SODIUM LACTATE AND CALCIUM CHLORIDE: 600; 310; 30; 20 INJECTION, SOLUTION INTRAVENOUS at 10:04

## 2023-04-11 RX ADMIN — SUCCINYLCHOLINE CHLORIDE 180 MG: 20 INJECTION, SOLUTION INTRAMUSCULAR; INTRAVENOUS at 08:04

## 2023-04-11 RX ADMIN — FENTANYL CITRATE 50 MCG: 50 INJECTION, SOLUTION INTRAMUSCULAR; INTRAVENOUS at 09:04

## 2023-04-11 RX ADMIN — PHENYLEPHRINE HYDROCHLORIDE 100 MCG: 10 INJECTION INTRAVENOUS at 09:04

## 2023-04-11 RX ADMIN — MIDAZOLAM HYDROCHLORIDE 1 MG: 1 INJECTION INTRAMUSCULAR; INTRAVENOUS at 07:04

## 2023-04-11 RX ADMIN — PROPOFOL 150 MG: 10 INJECTION, EMULSION INTRAVENOUS at 08:04

## 2023-04-11 RX ADMIN — LIDOCAINE HYDROCHLORIDE 60 MG: 20 INJECTION, SOLUTION INTRAVENOUS at 08:04

## 2023-04-11 RX ADMIN — LEVALBUTEROL HYDROCHLORIDE 1.25 MG: 1.25 SOLUTION RESPIRATORY (INHALATION) at 05:04

## 2023-04-11 NOTE — TRANSFER OF CARE
Anesthesia Transfer of Care Note    Patient: Katelyn Lopez    Procedure(s) Performed: Procedure(s) (LRB):  ARTHROSCOPY, KNEE, WITH partial lateral meniscectomy (Right)    Patient location: PACU    Anesthesia Type: general    Transport from OR: Transported from OR on room air with adequate spontaneous ventilation    Post pain: adequate analgesia    Post assessment: no apparent anesthetic complications    Post vital signs: stable    Level of consciousness: sedated    Nausea/Vomiting: no nausea/vomiting    Complications: none    Transfer of care protocol was followed

## 2023-04-11 NOTE — ANESTHESIA PROCEDURE NOTES
Intubation    Date/Time: 4/11/2023 8:41 AM  Performed by: Stephanie Perez CRNA  Authorized by: Stephanie Perez CRNA     Intubation:     Induction:  Intravenous    Intubated:  Postinduction    Mask Ventilation:  Easy with oral airway    Attempts:  1    Attempted By:  CRNA    Method of Intubation:  Direct    Blade:  Macdonald 2    Laryngeal View Grade: Grade I - full view of cords      Difficult Airway Encountered?: No      Complications:  None    Airway Device:  Oral endotracheal tube    Airway Device Size:  7.5    Style/Cuff Inflation:  Cuffed (inflated to minimal occlusive pressure)    Inflation Amount (mL):  5    Tube secured:  23    Secured at:  The lips    Placement Verified By:  Capnometry    Complicating Factors:  None    Findings Post-Intubation:  BS equal bilateral and atraumatic/condition of teeth unchanged

## 2023-04-11 NOTE — H&P
The patient has been examined and the H&P has been reviewed:     I concur with the findings and no changes have occurred since H&P was written.     Surgery risks, benefits and alternative options discussed and understood by patient/family.    Plan to proceed with Right knee arthroscopic partial lateral meniscectomy, possible intracondylar notch cyst debridement.    Terry Jaramillo MD    [facility] Orthopaedic Surgery Clinic Note     In brief, Katelyn Lopez is a 32 y.o. female presenting with right knee pain, chronic right lateral meniscus tear     Patient states that over the past year began to experience right knee pain.  No traumatic inciting event.  Insidious onset.  Worsening over time.  Worse with activity.  Occasional locking however no limitations to motion. Had a knee injection in September that only provided 2-3 days of relief.  Otherwise no other interventions.     Otherwise, no prior trauma.  No surgeries to right lower extremity.  Past medical history consistent with diabetes, hypertension, sickle cell trait, restless leg syndrome and fibromyalgia.  Patient does not smoke use nicotine products.  She has now completed a full course of physical therapy, still has symptomatic mechanical symptoms, locking, catching, lateral knee pain.     She returns today with continued pain mainly lateral along the right knee.        PMH:        Past Medical History:   Diagnosis Date    Asthma      Congenital heart disease      Diabetes mellitus type I      Hypertension      Respiratory distress      Stroke           PSH:         Past Surgical History:   Procedure Laterality Date     SECTION        GALLBLADDER SURGERY        GERD surgery        TONSILLECTOMY             SH:   Social History              Socioeconomic History    Marital status:    Tobacco Use    Smoking status: Never    Smokeless tobacco: Never   Substance and Sexual Activity    Alcohol use: Not Currently    Drug use: Never   Social History  "Narrative     ** Merged History Encounter **                  FH: No family history on file.     Allergies:         Review of patient's allergies indicates:   Allergen Reactions    Azithromycin      Bupropion      Choline,magnesium salicylate      Ciprofloxacin      Dulaglutide      Hydrochlorothiazide      Hydromorphone Itching       Other reaction(s): Injection site burning  has had demerol previously with itching, alleviated with benadryl.    Liraglutide      Peanut      Penicillins         Other reaction(s): vaginal burning    Sitagliptin      Sulfamethoxazole      Ziprasidone         Other reaction(s): "withdraw"    Cefixime Nausea Only         ROS:  Constitutional- no fever, fatigue, weakness  Eye- no vision loss, eye redness, drainage, or pain  ENMT- no sore throat, ear pain, sinus pain/congestion, nasal congestion/drainage  Respiratory- no cough, wheezing, or shortness of breath  CV- no chest pain, no palpitations, no edema  GI- no N/V/D; no abdominal pain     Physical Exam:  There were no vitals filed for this visit.        General: NAD  Cardio: RRR by peripheral pulse  Pulm: Normal WOB on room air, symmetric chest rise  Abd: Soft, NT/ND     MSK:  No significant effusion   Some tenderness along the lateral compartment   Knee range of motion 0-115; some pain with flexion   No varus or valgus instability  Firm endpoint with Lachman testing   Equivocal Lupe  Negative anterior-posterior drawer   Sensation intact to light touch distally   DP palpable     Imaging:   Radiographs and MRI of the right knee independently reviewed demonstrate some osteochondral damage to lateral compartment of the knee; there is evidence of bony infarcts both in the femur and the tibia; evidence of a complex lateral meniscal tear     Assessment:  32-year-old female chronic right knee pain with lateral meniscal tear      I have recommended surgical intervention: Right knee arthroscopic partial lateral meniscectomy, intracondylar " notch cyst debridement.  We discussed the details of the procedure and expected postoperative course.  We discussed the benefits of surgery which be to decrease her pain and increase function.  We discussed the risks of surgery which is small but could be significant if she is continued pain, stiffness, or infection.  After discussion she would like to proceed.

## 2023-04-11 NOTE — BRIEF OP NOTE
[facility] - Periop Services  Brief Operative Note    Surgery Date: 4/11/2023     Surgeon(s) and Role:     * Barrie Corona MD - Primary    Assisting Surgeon: None    Pre-op Diagnosis:  * No pre-op diagnosis entered *    Post-op Diagnosis:  Post-Op Diagnosis Codes:     * Complex tear of lateral meniscus of right knee as current injury, subsequent encounter [S83.271D]    Procedure(s) (LRB):  ARTHROSCOPY, KNEE, WITH partial lateral meniscectomy (Right)    Anesthesia: General    Operative Findings:  Lateral meniscus was found to be intact with some mild anterior horn fraying.  Lateral meniscus was found to be intact with some mild anterior horn fraying    DR Corona was present for essential  of the operation, including but no limited to patient positioning, surgical exposure,  wound closure, and dressing placement.

## 2023-04-11 NOTE — DISCHARGE INSTRUCTIONS
· Keep follow up appointment on April 24, 2023 @ 10:10 AM at [facility].    · Take pain medication as prescribed.    · Elevating the knee on  a pillow will help decrease pain and swelling, though the doctor says that you may put full weight on the knee and continue to flex and bend the knee, to keep it moving    · No driving or consuming alcohol for the next 24 hours or while taking narcotic pain medicine.    · May apply ice pack to surgical area for 20 minutes at a time 6-8 times per day.    ·  The orthopedic doctor has recommended weight loss to relieve the pressure on your joints.  He has recommended seeing a bariatric special list.  You may need to get this referall from your primary care doctor or from [facility] ([phone])    · Thanks for choosing [facility]! Have a smooth recovery!

## 2023-04-11 NOTE — ANESTHESIA PROCEDURE NOTES
Peripheral Block    Patient location during procedure: holding area   Block not for primary anesthetic.  Reason for block: at surgeon's request and post-op pain management   Post-op Pain Location: right      Staffing  Authorizing Provider: Maeve Holden MD  Performing Provider: Maeve Holden MD    Preanesthetic Checklist  Completed: patient identified, IV checked, site marked, risks and benefits discussed, surgical consent, monitors and equipment checked, pre-op evaluation and timeout performed  Peripheral Block  Patient position: supine  Prep: ChloraPrep  Patient monitoring: heart rate, cardiac monitor, continuous pulse ox, continuous capnometry and frequent blood pressure checks  Block type: adductor canal  Laterality: right  Injection technique: single shot  Needle  Needle type: Stimuplex   Needle gauge: 21 G  Needle length: 4 in  Needle localization: ultrasound guidance   -ultrasound image captured on disc.  Assessment  Injection assessment: negative aspiration, negative parasthesia and local visualized surrounding nerve  Paresthesia pain: none  Heart rate change: no  Slow fractionated injection: yes  Pain Tolerance: comfortable throughout block and no complaints  Medications:    Medications: ropivacaine (NAROPIN) injection 0.5% - Perineural   20 mL - 4/11/2023 8:10:00 AM    Additional Notes  VSS.  [facility] RN monitoring vitals throughout procedure.  Patient tolerated procedure well.     1:400,000 epi

## 2023-04-11 NOTE — BRIEF OP NOTE
[facility] - Periop Services  Brief Operative Note    Surgery Date: 4/11/2023     Surgeon(s) and Role:     * Barrie Corona MD - Primary    Assisting Surgeon: None    Pre-op Diagnosis:  * No pre-op diagnosis entered *    Post-op Diagnosis:  Post-Op Diagnosis Codes:     * Complex tear of lateral meniscus of right knee as current injury, subsequent encounter [S83.271D]    Procedure(s) (LRB):  ARTHROSCOPY, KNEE, WITH partial lateral meniscectomy (Right)    Anesthesia: General    Operative Findings: per op note    Estimated Blood Loss: * No values recorded between 4/11/2023  9:11 AM and 4/11/2023  9:57 AM *         Specimens:   Specimen (24h ago, onward)      None              Discharge Note    OUTCOME: Patient tolerated treatment/procedure well without complication and is now ready for discharge.    DISPOSITION: Home or Self Care    FINAL DIAGNOSIS:  <principal problem not specified>    FOLLOWUP: In clinic    DISCHARGE INSTRUCTIONS:    Discharge Procedure Orders   Diet general     Call MD for:  temperature >100.4     Call MD for:  persistent nausea and vomiting     Call MD for:  severe uncontrolled pain     Call MD for:  difficulty breathing, headache or visual disturbances     Call MD for:  redness, tenderness, or signs of infection (pain, swelling, redness, odor or green/yellow discharge around incision site)     Call MD for:  hives     Call MD for:  persistent dizziness or light-headedness     Call MD for:  extreme fatigue     Keep surgical extremity elevated     Ice to affected area   Order Comments: using barrier between ice and skin (specify duration&frequency)     No driving, operating heavy equipment or signing legal documents while taking pain medication     Leave dressing on - Keep it clean, dry, and intact until clinic visit     Activity as tolerated     Weight bearing as tolerated

## 2023-04-11 NOTE — OP NOTE
Operative Report    VALENTIN PETTY  : 1990  MRN: [id number]    Date of Procedure: 2023    Pre-operative Diagnosis:    Right knee lateral meniscus tear    Post-operative Diagnosis:   Same  Post-Op Diagnosis Codes:     * Complex tear of lateral meniscus of right knee as current injury, subsequent encounter [S83.271D]    Procedure:    Right knee lateral meniscectomy (76798)    Surgeon: Barrie Corona MD    Assistant:    Terry Jaramillo MD     Anesthesia: General     EBL: None.    IVF: Per anesthesia    Findings:       Effusion: None   Patella:  Normal cartilage lateral patella facet, lateral patellar tilt without subluxation, Grade 3-4 changes medial patellar facet   Trochlea: Normal, Grade II chondromalacia, Grade III chondromalacia, Grade IV chondromalacia, and Dysplastic   Medial Compartment:   Medial cartilage: Normal    Medial meniscus: Normal   Lateral Compartment:   Lateral cartilage: Grade III chondromalacia    Lateral meniscus:  Horizontal fraying anterior body without discreet tear or instability  ACL/PCL: Normal   Other: None        Indications for surgery  Valentin Petty is a 32 y.o. female patient who was seen in the clinic complaining of pain, swelling and mechanical symptoms.  History, exam and imaging are consistent with a torn meniscus.  The risks and benefits of nonoperative versus operative treatment were discussed.  The patient was oriented as to the diagnosis and the treatment options.  The risks of surgery including, but not limited to, infection, bleeding, nerve and vessel injury, and ongoing pain. Understanding the risks, the patient elected to proceed with surgery.    Procedure in Detail  Prior to the surgical procedure, the patient was identified in the preoperative holding area.  We had a thorough discussion about the risks and benefits of surgery including the expected post operative protocol.  The patient signed an informed consent and the operative extremity was marked.  A  regional anesthesia block was  performed by the Anesthesiology team prior to surgery.  Preoperative antibiotics were given prior to incision for infection prophylaxis. The patient was taken to the operating room and positioned on the table in the supine position.  After general anesthesia was induced, a well padded tourniquet was placed but not inflated.  An exam was performed, which showed the knee was stable to ligamentous examination.  The patient was subsequently prepped and draped in the usual orthopaedic sterile fashion.  A surgical timeout was performed confirming the surgical site and procedure prior to proceeding.  Local anesthetic was injected into the proposed portals.     A standard anterolateral portal was made, and the arthroscope was introduced.  Under direct vision with the use of a spinal needle an anteromedial portal was made, and a probe was placed in the knee.  A thorough diagnostic arthroscopy was performed, and the above listed findings were seen.      Attention was turned to the torn lateral meniscus.  Fraying of the anterior body and horn was visualized. No discreet tear or instability noted. A motorized shaver was used to restore normal contour.  The probe demonstrated that the remaining meniscal tissue was stable.      A thorough lavage of the knee was performed, and the arthroscopic instruments were removed.  The portals were closed with 3-0 Nylon.  A dry compressive dressing was applied.  The sponge, needle, and instrument counts were correct at the end of the case.  The patient tolerated the procedure well, was extubated, and was taken to recovery in stable condition.    Post-Operative Management:  The patient will be WBAT on the operative extremity.   Follow up [facility] orth[facility] clinic 2 weeks     Complications: No     Condition: Good     Disposition: PACU - hemodynamically stable.     Attestation: Chloe BLACK present and scrubbed for the critical portions of the procedure.    Implants:   * No  implants in log *

## 2023-04-11 NOTE — ANESTHESIA POSTPROCEDURE EVALUATION
Anesthesia Post Evaluation    Patient: Katelyn Lopez    Procedure(s) Performed: Procedure(s) (LRB):  ARTHROSCOPY, KNEE, WITH partial lateral meniscectomy (Right)    Final Anesthesia Type: general      Patient location during evaluation: PACU  Patient participation: Yes- Able to Participate  Level of consciousness: awake and alert  Post-procedure vital signs: reviewed and stable  Pain management: adequate  Airway patency: patent    PONV status at discharge: No PONV  Anesthetic complications: no      Cardiovascular status: hemodynamically stable  Respiratory status: unassisted  Hydration status: euvolemic  Follow-up not needed.          Vitals Value Taken Time   /88 04/11/23 1055   Temp 35.7 °C (96.2 °F) 04/11/23 0957   Pulse 80 04/11/23 1055   Resp 18 04/11/23 1120   SpO2 96 % 04/11/23 1055         No case tracking events are documented in the log.      Pain/Bjorn Score: Pain Rating Prior to Med Admin: 6 (4/11/2023 11:20 AM)

## 2023-04-12 VITALS
TEMPERATURE: 98 F | OXYGEN SATURATION: 99 % | RESPIRATION RATE: 19 BRPM | DIASTOLIC BLOOD PRESSURE: 67 MMHG | SYSTOLIC BLOOD PRESSURE: 115 MMHG | HEART RATE: 88 BPM

## 2023-04-24 ENCOUNTER — OFFICE VISIT (OUTPATIENT)
Dept: ORTHOPEDICS | Facility: CLINIC | Age: 33
End: 2023-04-24
Payer: MEDICAID

## 2023-04-24 VITALS — WEIGHT: 293 LBS | BODY MASS INDEX: 51.91 KG/M2 | HEIGHT: 63 IN

## 2023-04-24 DIAGNOSIS — S83.271D COMPLEX TEAR OF LATERAL MENISCUS OF RIGHT KNEE AS CURRENT INJURY, SUBSEQUENT ENCOUNTER: Primary | ICD-10-CM

## 2023-04-24 PROCEDURE — 1159F MED LIST DOCD IN RCRD: CPT | Mod: CPTII,,, | Performed by: ORTHOPAEDIC SURGERY

## 2023-04-24 PROCEDURE — 3008F BODY MASS INDEX DOCD: CPT | Mod: CPTII,,, | Performed by: ORTHOPAEDIC SURGERY

## 2023-04-24 PROCEDURE — 1159F PR MEDICATION LIST DOCUMENTED IN MEDICAL RECORD: ICD-10-PCS | Mod: CPTII,,, | Performed by: ORTHOPAEDIC SURGERY

## 2023-04-24 PROCEDURE — 99024 POSTOP FOLLOW-UP VISIT: CPT | Mod: ,,, | Performed by: ORTHOPAEDIC SURGERY

## 2023-04-24 PROCEDURE — 99214 OFFICE O/P EST MOD 30 MIN: CPT | Mod: PBBFAC

## 2023-04-24 PROCEDURE — 1160F RVW MEDS BY RX/DR IN RCRD: CPT | Mod: CPTII,,, | Performed by: ORTHOPAEDIC SURGERY

## 2023-04-24 PROCEDURE — 99024 PR POST-OP FOLLOW-UP VISIT: ICD-10-PCS | Mod: ,,, | Performed by: ORTHOPAEDIC SURGERY

## 2023-04-24 PROCEDURE — 3008F PR BODY MASS INDEX (BMI) DOCUMENTED: ICD-10-PCS | Mod: CPTII,,, | Performed by: ORTHOPAEDIC SURGERY

## 2023-04-24 PROCEDURE — 1160F PR REVIEW ALL MEDS BY PRESCRIBER/CLIN PHARMACIST DOCUMENTED: ICD-10-PCS | Mod: CPTII,,, | Performed by: ORTHOPAEDIC SURGERY

## 2023-04-24 NOTE — PROGRESS NOTES
[facility]    In brief, Katelyn Loepz is a 33 y.o. female presenting postop from right knee arthroscopy.    Patient doing okay.  Still using a cane to get around, and has a little bit of stiffness in the patella.          PMH:   Past Medical History:   Diagnosis Date    Asthma     Congenital heart disease     Diabetes mellitus type I     Hypertension     Respiratory distress     Stroke        PSH:   Past Surgical History:   Procedure Laterality Date     SECTION      GALLBLADDER SURGERY      GERD surgery      KNEE ARTHROSCOPY W/ MENISCECTOMY Right 2023    Procedure: ARTHROSCOPY, KNEE, WITH partial lateral meniscectomy;  Surgeon: Barrie Corona MD;  Location: [facility];  Service: Orthopedics;  Laterality: Right;  supine, leg younger, tourniquet    TONSILLECTOMY         SH:   Social History     Socioeconomic History    Marital status:    Tobacco Use    Smoking status: Never    Smokeless tobacco: Never   Substance and Sexual Activity    Alcohol use: Not Currently    Drug use: Never   Social History Narrative    ** Merged History Encounter **            FH: No family history on file.    Allergies:   Review of patient's allergies indicates:   Allergen Reactions    Azithromycin     Bupropion     Choline,magnesium salicylate     Ciprofloxacin     Dulaglutide     Hydrochlorothiazide     Hydromorphone Itching     Other reaction(s): Injection site burning  has had demerol previously with itching, alleviated with benadryl.    Liraglutide     Peanut     Penicillins      Other reaction(s): vaginal burning    Sitagliptin     Sulfamethoxazole     Ziprasidone      Other reaction(s): "withdraw"    Cefixime Nausea Only       ROS:  Constitutional- no fever, fatigue, weakness  Eye- no vision loss, eye redness, drainage, or pain  ENMT- no sore throat, ear pain, sinus pain/congestion, nasal congestion/drainage  Respiratory- no cough, wheezing, or shortness of breath  CV- no chest pain, no " palpitations, no edema  GI- no N/V/D; no abdominal pain    Physical Exam:  There were no vitals filed for this visit.      General: NAD  Cardio: RRR by peripheral pulse  Pulm: Normal WOB on room air, symmetric chest rise  Abd: Soft, NT/ND    MSK:  Sutures in place no sign of infection  No significant effusion   Some tenderness along the lateral compartment   Knee range of motion 0-115; some pain with flexion   No varus or valgus instability  Firm endpoint with Lachman testing   Negative anterior-posterior drawer   Sensation intact to light touch distally   DP palpable    Imaging:   No new imaging    Assessment:  32-year-old female right knee arthroscopy on 04/11/2023 showing healed lateral meniscal tear with some mild fraying, grade 3 chondromalacia of lateral femoral condyle, patella, trochlea      -PT order for strengthening and range of motion right knee  -sutures out today   -she will follow up as needed    Work note:   Please use patient from work as she just had surgery on 04/11/2023, she can return to work without restrictions on 05/15/2023.          Marcos Bourgeois MD  [facility]  4/24/2023 3:08 PM

## 2023-04-24 NOTE — PROGRESS NOTES
Faculty Attestation: Katelyn Lopez  was seen at [facility] in the Orthopaedic Clinic. Patient chart reviewed. History of Present Illness, Physical Exam, and Assessment and Plan reviewed. Treatment plan is reasonable and appropriate. Compliance with treatment recommendations is important. No procedure was performed.     Asaf Das MD  Orthopaedic Surgery

## 2023-05-17 LAB — POCT GLUCOSE: 195 MG/DL (ref 70–110)

## 2023-05-19 ENCOUNTER — OFFICE VISIT (OUTPATIENT)
Dept: UROLOGY | Facility: CLINIC | Age: 33
End: 2023-05-19
Payer: MEDICAID

## 2023-05-19 VITALS
SYSTOLIC BLOOD PRESSURE: 122 MMHG | BODY MASS INDEX: 51.91 KG/M2 | OXYGEN SATURATION: 100 % | HEIGHT: 63 IN | DIASTOLIC BLOOD PRESSURE: 87 MMHG | TEMPERATURE: 99 F | RESPIRATION RATE: 19 BRPM | HEART RATE: 105 BPM | WEIGHT: 293 LBS

## 2023-05-19 DIAGNOSIS — N39.41 URGE INCONTINENCE: ICD-10-CM

## 2023-05-19 DIAGNOSIS — N30.90 RECURRENT CYSTITIS: ICD-10-CM

## 2023-05-19 DIAGNOSIS — R33.9 INCOMPLETE BLADDER EMPTYING: Primary | ICD-10-CM

## 2023-05-19 LAB
BILIRUB SERPL-MCNC: NORMAL MG/DL
BLOOD URINE, POC: NORMAL
COLOR, POC UA: NORMAL
GLUCOSE UR QL STRIP: NORMAL
KETONES UR QL STRIP: NORMAL
LEUKOCYTE ESTERASE URINE, POC: NORMAL
NITRITE, POC UA: NORMAL
PH, POC UA: 7
PROTEIN, POC: NORMAL
SPECIFIC GRAVITY, POC UA: 1.02
UROBILINOGEN, POC UA: 0.2

## 2023-05-19 PROCEDURE — 99215 OFFICE O/P EST HI 40 MIN: CPT | Mod: PBBFAC | Performed by: UROLOGY

## 2023-05-19 PROCEDURE — 3079F DIAST BP 80-89 MM HG: CPT | Mod: CPTII,,, | Performed by: UROLOGY

## 2023-05-19 PROCEDURE — 1159F MED LIST DOCD IN RCRD: CPT | Mod: CPTII,,, | Performed by: UROLOGY

## 2023-05-19 PROCEDURE — 3079F PR MOST RECENT DIASTOLIC BLOOD PRESSURE 80-89 MM HG: ICD-10-PCS | Mod: CPTII,,, | Performed by: UROLOGY

## 2023-05-19 PROCEDURE — 3074F PR MOST RECENT SYSTOLIC BLOOD PRESSURE < 130 MM HG: ICD-10-PCS | Mod: CPTII,,, | Performed by: UROLOGY

## 2023-05-19 PROCEDURE — 1159F PR MEDICATION LIST DOCUMENTED IN MEDICAL RECORD: ICD-10-PCS | Mod: CPTII,,, | Performed by: UROLOGY

## 2023-05-19 PROCEDURE — 3008F BODY MASS INDEX DOCD: CPT | Mod: CPTII,,, | Performed by: UROLOGY

## 2023-05-19 PROCEDURE — 3008F PR BODY MASS INDEX (BMI) DOCUMENTED: ICD-10-PCS | Mod: CPTII,,, | Performed by: UROLOGY

## 2023-05-19 PROCEDURE — 3074F SYST BP LT 130 MM HG: CPT | Mod: CPTII,,, | Performed by: UROLOGY

## 2023-05-19 PROCEDURE — 81001 URINALYSIS AUTO W/SCOPE: CPT | Mod: PBBFAC | Performed by: UROLOGY

## 2023-05-19 PROCEDURE — 99214 PR OFFICE/OUTPT VISIT, EST, LEVL IV, 30-39 MIN: ICD-10-PCS | Mod: S$PBB,,, | Performed by: UROLOGY

## 2023-05-19 PROCEDURE — 99214 OFFICE O/P EST MOD 30 MIN: CPT | Mod: S$PBB,,, | Performed by: UROLOGY

## 2023-05-19 RX ORDER — TOLTERODINE 4 MG/1
4 CAPSULE, EXTENDED RELEASE ORAL DAILY
Qty: 90 CAPSULE | Refills: 3 | Status: SHIPPED | OUTPATIENT
Start: 2023-05-19 | End: 2024-03-18 | Stop reason: SDUPTHER

## 2023-05-19 NOTE — PROGRESS NOTES
Patient seen by Dr. EMRE Cortez. Will return for cysto. Written and verbal discharge instructions given.

## 2023-05-19 NOTE — PROGRESS NOTES
CC:  Medication follow-up    HPI:  Katelyn Lopez is a 33 y.o. female here for follow-up of medication trial.  She has urgency and urge incontinence.  She is been on Myrbetriq for at least the last four weeks.  She can now hold the urine a little bit longer but she is still having episodes of urge incontinence.  She has had problems with recurrent urinary tract infections.  Her urine culture at the last visit was actually no growth although we did treat her with antibiotics.  Today she does not complain of dysuria but does have some lower abdominal discomfort.  She is found to have incomplete bladder emptying today.  She does have diabetes and also has gastroparesis.    Bladder scan residual:  245 ml    Urinalysis:      Color, UA Light Yellow    Spec Grav UA 1.020    pH, UA 7.0    WBC, UA small    Nitrite, UA neg    Protein, POC neg    Glucose, UA neg    Ketones, UA neg    Urobilinogen, UA 0.2    Bilirubin, POC neg    Blood, UA neg         Microscopic:  Negative      Specimen Collected: 23 12:13             ROS:  All systems reviewed and are negative except as documented in HPI and/or Assessment and Plan.     Patient Active Problem List:     Patient Active Problem List   Diagnosis    Right knee pain    Difficulty walking    Internal derangement of right knee    BMI 50.0-59.9, adult    Tear of lateral meniscus of right knee, current        Past Medical History:  Past Medical History:   Diagnosis Date    Asthma     Congenital heart disease     Diabetes mellitus type I     Hypertension     Respiratory distress     Stroke         Past Surgical History:  Past Surgical History:   Procedure Laterality Date     SECTION      CHOLECYSTECTOMY  2016    GALLBLADDER SURGERY      GERD surgery      KNEE ARTHROSCOPY W/ MENISCECTOMY Right 2023    Procedure: ARTHROSCOPY, KNEE, WITH partial lateral meniscectomy;  Surgeon: Barrie Corona MD;  Location: [facility];  Service: Orthopedics;  Laterality: Right;  supine, leg  "carisa, tourniquet    TONSILLECTOMY          Family History:  Family History   Problem Relation Age of Onset    Arthritis Mother     Asthma Mother     Diabetes Mother     Hypertension Mother     Heart disease Maternal Grandmother     Stroke Maternal Grandmother     Cancer Paternal Grandfather     Cancer Paternal Grandmother     Vision loss Paternal Grandmother     Arthritis Brother     Asthma Brother     Birth defects Brother     Depression Brother     Diabetes Brother     Hypertension Brother     Learning disabilities Brother     Mental illness Brother     Mental retardation Brother     Arthritis Maternal Aunt         Mainly whole family    Asthma Son         Social History:  Social History     Socioeconomic History    Marital status:    Tobacco Use    Smoking status: Never    Smokeless tobacco: Never   Substance and Sexual Activity    Alcohol use: Never    Drug use: Never    Sexual activity: Never   Social History Narrative    ** Merged History Encounter **             Allergies:  Review of patient's allergies indicates:   Allergen Reactions    Azithromycin     Bupropion     Choline,magnesium salicylate     Ciprofloxacin     Dulaglutide     Hydrochlorothiazide     Hydromorphone Itching     Other reaction(s): Injection site burning  has had demerol previously with itching, alleviated with benadryl.    Liraglutide     Peanut     Penicillins      Other reaction(s): vaginal burning    Sitagliptin     Sulfamethoxazole     Ziprasidone      Other reaction(s): "withdraw"    Cefixime Nausea Only        Objective:  Vitals:    05/19/23 1226   BP: 122/87   Pulse: 105   Resp: 19   Temp: 98.6 °F (37 °C)     General:  Well developed, well nourished adult female in no acute distress  Abdomen: Soft, nontender, no masses  Extremities:  No clubbing, cyanosis, or edema  Neurologic:  Grossly intact  Musculoskeletal:  Normal tone      Assessment:  1. Incomplete bladder emptying  - POCT URINE DIPSTICK WITH MICROSCOPE, " AUTOMATED  - POCT Bladder Scan    2. Urge incontinence  - tolterodine (DETROL LA) 4 MG 24 hr capsule; Take 1 capsule (4 mg total) by mouth once daily.  Dispense: 90 capsule; Refill: 3    3. Recurrent cystitis       Plan:  The incomplete bladder emptying could be due to either obstruction versus the effects on the bladder from the diabetes.  We will do a cystoscopy to rule out an obstructive process.  After that we will consider trying bethanechol if there is no obstructive process found.  She may need urodynamics.  Continue Myrbetriq.  Will add tolterodine.  She did have problems with oxybutynin making it more difficult to urinate so she will observe for this and stop the medication immediately if that occurs.  Observation of the recurrent cystitis.  Urine culture at last visit was negative and her urinalysis today is negative.    Follow-up:    For cystoscopy.

## 2023-06-07 DIAGNOSIS — R30.0 DYSURIA: Primary | ICD-10-CM

## 2023-06-30 ENCOUNTER — PROCEDURE VISIT (OUTPATIENT)
Dept: UROLOGY | Facility: CLINIC | Age: 33
End: 2023-06-30
Payer: MEDICAID

## 2023-06-30 VITALS
WEIGHT: 293 LBS | BODY MASS INDEX: 51.91 KG/M2 | HEIGHT: 63 IN | DIASTOLIC BLOOD PRESSURE: 76 MMHG | SYSTOLIC BLOOD PRESSURE: 114 MMHG | OXYGEN SATURATION: 98 % | RESPIRATION RATE: 18 BRPM | HEART RATE: 104 BPM

## 2023-06-30 DIAGNOSIS — T19.1XXA FOREIGN BODY IN BLADDER, INITIAL ENCOUNTER: ICD-10-CM

## 2023-06-30 DIAGNOSIS — N39.41 URGE INCONTINENCE: ICD-10-CM

## 2023-06-30 DIAGNOSIS — R33.9 INCOMPLETE BLADDER EMPTYING: ICD-10-CM

## 2023-06-30 DIAGNOSIS — R30.0 DYSURIA: Primary | ICD-10-CM

## 2023-06-30 LAB
BILIRUB SERPL-MCNC: NORMAL MG/DL
BLOOD URINE, POC: NORMAL
COLOR, POC UA: YELLOW
GLUCOSE UR QL STRIP: NORMAL
KETONES UR QL STRIP: NORMAL
LEUKOCYTE ESTERASE URINE, POC: NORMAL
NITRITE, POC UA: NORMAL
PH, POC UA: 7
PROTEIN, POC: NORMAL
SPECIFIC GRAVITY, POC UA: 1.02
UROBILINOGEN, POC UA: 0.2

## 2023-06-30 PROCEDURE — 99499 NO LOS: ICD-10-PCS | Mod: S$PBB,,, | Performed by: UROLOGY

## 2023-06-30 PROCEDURE — 81001 URINALYSIS AUTO W/SCOPE: CPT | Mod: PBBFAC | Performed by: UROLOGY

## 2023-06-30 PROCEDURE — 99499 UNLISTED E&M SERVICE: CPT | Mod: S$PBB,,, | Performed by: UROLOGY

## 2023-06-30 PROCEDURE — 52310 CYSTOSCOPY AND TREATMENT: CPT | Mod: S$PBB,,, | Performed by: UROLOGY

## 2023-06-30 PROCEDURE — 52310 PR CYSTOSCOPY,REMV CALCULUS,SIMPLE: ICD-10-PCS | Mod: S$PBB,,, | Performed by: UROLOGY

## 2023-06-30 PROCEDURE — 52310 CYSTOSCOPY AND TREATMENT: CPT | Mod: PBBFAC | Performed by: UROLOGY

## 2023-06-30 RX ORDER — KETOROLAC TROMETHAMINE 10 MG/1
10 TABLET, FILM COATED ORAL 3 TIMES DAILY
COMMUNITY
Start: 2023-06-29

## 2023-06-30 RX ORDER — BETHANECHOL CHLORIDE 25 MG/1
25 TABLET ORAL 3 TIMES DAILY
Qty: 90 TABLET | Refills: 1 | Status: SHIPPED | OUTPATIENT
Start: 2023-06-30 | End: 2023-08-29

## 2023-06-30 RX ORDER — LIDOCAINE HYDROCHLORIDE 20 MG/ML
JELLY TOPICAL
Status: COMPLETED | OUTPATIENT
Start: 2023-06-30 | End: 2023-06-30

## 2023-06-30 RX ORDER — CEFDINIR 300 MG/1
300 CAPSULE ORAL 2 TIMES DAILY
COMMUNITY
Start: 2023-06-29

## 2023-06-30 RX ORDER — ONDANSETRON 4 MG/1
4 TABLET, ORALLY DISINTEGRATING ORAL EVERY 6 HOURS PRN
COMMUNITY
Start: 2023-06-29

## 2023-06-30 RX ORDER — FLUCONAZOLE 100 MG/1
100 TABLET ORAL
COMMUNITY
Start: 2023-06-29

## 2023-06-30 RX ADMIN — LIDOCAINE HYDROCHLORIDE: 20 JELLY TOPICAL at 02:06

## 2023-06-30 NOTE — PROGRESS NOTES
Pt seen by Dr. Lua. Cysto performed in clinic. Consents signed and obtained by staff. Pt received medication per procedure protocol. LIDOcaine HCl 2% urojet administered and tolerated well. RTC 1 month. Pt education given both written and verbal.

## 2023-06-30 NOTE — PROCEDURES
CC:  Cystoscopy    HPI:  Katelyn Lopez is a 33 y.o. female here for cystoscopy for incomplete bladder emptying.  She was found to have incomplete bladder emptying at the last visit.  She was placed on tamsulosin.  She is not really sure if this is helping.  She has urgency and urge incontinence.  She is on Myrbetriq for that which is helping but she does still have some episodes of incontinence.  She has recurrent episodes of urinary tract infections.  She just went to [facility] for right upper quadrant and right back pain.  She was told she had a urinary tract infection and a culture was done but is not yet available.  She had a CT scan which she reports showed an acute urinary tract infection.  She is on cefdinir and also got some IV antibiotics.    Urinalysis:  Results for orders placed or performed in visit on 23   POCT URINE DIPSTICK WITH MICROSCOPE, AUTOMATED   Result Value Ref Range    Color, UA Yellow     Spec Grav UA 1.020     pH, UA 7.0     WBC, UA large     Nitrite, UA neg     Protein, POC neg     Glucose, UA neg     Ketones, UA neg     Urobilinogen, UA 0.2     Bilirubin, POC neg     Blood, UA trace-intact      Microscopic:  1-2 RBC, 5-6 WBC, 2-3 squamous epithelial cells per HPF       ROS:  All systems reviewed and are negative except as documented in HPI and/or Assessment and Plan.     Patient Active Problem List:     Patient Active Problem List   Diagnosis    Right knee pain    Difficulty walking    Internal derangement of right knee    BMI 50.0-59.9, adult    Tear of lateral meniscus of right knee, current        Past Medical History:  Past Medical History:   Diagnosis Date    Asthma     Congenital heart disease     Diabetes mellitus type I     Hypertension     Respiratory distress     Stroke         Past Surgical History:  Past Surgical History:   Procedure Laterality Date     SECTION      CHOLECYSTECTOMY  2016    GALLBLADDER SURGERY      GERD surgery      KNEE ARTHROSCOPY W/  "MENISCECTOMY Right 04/11/2023    Procedure: ARTHROSCOPY, KNEE, WITH partial lateral meniscectomy;  Surgeon: Barrie Corona MD;  Location: [facility];  Service: Orthopedics;  Laterality: Right;  supine, leg younger, tourniquet    TONSILLECTOMY          Family History:  Family History   Problem Relation Age of Onset    Arthritis Mother     Asthma Mother     Diabetes Mother     Hypertension Mother     Heart disease Maternal Grandmother     Stroke Maternal Grandmother     Cancer Paternal Grandfather     Cancer Paternal Grandmother     Vision loss Paternal Grandmother     Arthritis Brother     Asthma Brother     Birth defects Brother     Depression Brother     Diabetes Brother     Hypertension Brother     Learning disabilities Brother     Mental illness Brother     Mental retardation Brother     Arthritis Maternal Aunt         Mainly whole family    Asthma Son         Social History:  Social History     Socioeconomic History    Marital status:    Tobacco Use    Smoking status: Never    Smokeless tobacco: Never   Substance and Sexual Activity    Alcohol use: Never    Drug use: Never    Sexual activity: Never   Social History Narrative    ** Merged History Encounter **             Allergies:  Review of patient's allergies indicates:   Allergen Reactions    Azithromycin     Bupropion     Choline,magnesium salicylate     Ciprofloxacin     Dulaglutide     Hydrochlorothiazide     Hydromorphone Itching     Other reaction(s): Injection site burning  has had demerol previously with itching, alleviated with benadryl.    Liraglutide     Peanut     Penicillins      Other reaction(s): vaginal burning    Sitagliptin     Sulfamethoxazole     Ziprasidone      Other reaction(s): "withdraw"    Cefixime Nausea Only        Objective:  Vitals:    06/30/23 1412   BP: 114/76   Pulse: 104   Resp: 18     General:  Well developed, well nourished adult female in no acute distress  Abdomen: Soft, nontender, no masses  Extremities:  No " clubbing, cyanosis, or edema  Neurologic:  Grossly intact  Musculoskeletal:  Normal tone  :  External genitalia is normal without lesions.  Vagina is normal.      Cystoscopy:        - Urethral meatus:  No strictures        - Urethra:  Normal without strictures or lesions        - Bladder neck:  Normal        - Bladder:  No mucosal abnormalities.  There was a thread like foreign body in the bladder.  This was grasped with a zero tip basket and extracted.          - Ureteral orifices:  On the trigone with clear efflux bilaterally    The patient tolerated the procedure well without complications.  She was given Cipro 500mg, one tablet in the clinic.   The urethra was anesthetized with 2% Lidocaine Jelly, Urojet.      Assessment:  1. Dysuria  - POCT URINE DIPSTICK WITH MICROSCOPE, AUTOMATED    2. Incomplete bladder emptying  - bethanechol (URECHOLINE) 25 MG Tab; Take 1 tablet (25 mg total) by mouth 3 (three) times daily.  Dispense: 90 tablet; Refill: 1    3. Urge incontinence    4. Foreign body in bladder, initial encounter     Plan:  We will get the urine culture results on Monday to confirm that she is on the right medication.  The bladder actually appeared normal without signs of an acute cystitis.  She is going to stop the tamsulosin and I am starting her on bethanechol to see if this will help with the emptying.  Continue Myrbetriq for the urge incontinence.    Follow-up:    One month for bladder scan.

## 2023-08-11 ENCOUNTER — OFFICE VISIT (OUTPATIENT)
Dept: UROLOGY | Facility: CLINIC | Age: 33
End: 2023-08-11
Payer: MEDICAID

## 2023-08-11 VITALS
HEART RATE: 105 BPM | HEIGHT: 64 IN | BODY MASS INDEX: 50.02 KG/M2 | OXYGEN SATURATION: 99 % | SYSTOLIC BLOOD PRESSURE: 108 MMHG | WEIGHT: 293 LBS | TEMPERATURE: 99 F | RESPIRATION RATE: 20 BRPM | DIASTOLIC BLOOD PRESSURE: 76 MMHG

## 2023-08-11 DIAGNOSIS — N39.41 URGE INCONTINENCE: ICD-10-CM

## 2023-08-11 DIAGNOSIS — N30.90 RECURRENT CYSTITIS: ICD-10-CM

## 2023-08-11 DIAGNOSIS — R33.9 INCOMPLETE BLADDER EMPTYING: Primary | ICD-10-CM

## 2023-08-11 LAB
BILIRUB SERPL-MCNC: NEGATIVE MG/DL
BLOOD URINE, POC: NEGATIVE
COLOR, POC UA: NORMAL
GLUCOSE UR QL STRIP: 250
KETONES UR QL STRIP: NEGATIVE
LEUKOCYTE ESTERASE URINE, POC: NORMAL
NITRITE, POC UA: NEGATIVE
PH, POC UA: 6.5
PROTEIN, POC: NEGATIVE
SPECIFIC GRAVITY, POC UA: 1.01
UROBILINOGEN, POC UA: 0.2

## 2023-08-11 PROCEDURE — 3074F PR MOST RECENT SYSTOLIC BLOOD PRESSURE < 130 MM HG: ICD-10-PCS | Mod: CPTII,,, | Performed by: UROLOGY

## 2023-08-11 PROCEDURE — 3008F PR BODY MASS INDEX (BMI) DOCUMENTED: ICD-10-PCS | Mod: CPTII,,, | Performed by: UROLOGY

## 2023-08-11 PROCEDURE — 99215 OFFICE O/P EST HI 40 MIN: CPT | Mod: PBBFAC | Performed by: UROLOGY

## 2023-08-11 PROCEDURE — 3074F SYST BP LT 130 MM HG: CPT | Mod: CPTII,,, | Performed by: UROLOGY

## 2023-08-11 PROCEDURE — 3078F PR MOST RECENT DIASTOLIC BLOOD PRESSURE < 80 MM HG: ICD-10-PCS | Mod: CPTII,,, | Performed by: UROLOGY

## 2023-08-11 PROCEDURE — 3008F BODY MASS INDEX DOCD: CPT | Mod: CPTII,,, | Performed by: UROLOGY

## 2023-08-11 PROCEDURE — 87088 URINE BACTERIA CULTURE: CPT | Performed by: UROLOGY

## 2023-08-11 PROCEDURE — 81001 URINALYSIS AUTO W/SCOPE: CPT | Mod: PBBFAC | Performed by: UROLOGY

## 2023-08-11 PROCEDURE — 3078F DIAST BP <80 MM HG: CPT | Mod: CPTII,,, | Performed by: UROLOGY

## 2023-08-11 PROCEDURE — 99214 PR OFFICE/OUTPT VISIT, EST, LEVL IV, 30-39 MIN: ICD-10-PCS | Mod: S$PBB,,, | Performed by: UROLOGY

## 2023-08-11 PROCEDURE — 99214 OFFICE O/P EST MOD 30 MIN: CPT | Mod: S$PBB,,, | Performed by: UROLOGY

## 2023-08-11 NOTE — PROGRESS NOTES
CC:  Follow-up    HPI:  Katelyn Lopez is a 33 y.o. female here for follow-up.  She has urgency and urge incontinence.  She is on Myrbetriq and tolterodine.  This is helping.  She has had problems with recurrent urinary tract infections.  One month ago she was told she had a urinary tract infection from [place].  She was given doxycycline.  She states that she had vomiting, pain in the lower abdomen, and constipation.  She is off medications now.  She is found to have incomplete bladder emptying and was placed on bethanechol but she is stopped taking that.  She does have diabetes and also has gastroparesis    Urinalysis:    Results for orders placed or performed in visit on 23   POCT URINE DIPSTICK WITH MICROSCOPE, AUTOMATED   Result Value Ref Range    Color, UA Light Yellow     Spec Grav UA 1.015     pH, UA 6.5     WBC, UA Moderate     Nitrite, UA Negative     Protein, POC Negative     Glucose,      Ketones, UA Negative     Urobilinogen, UA 0.2     Bilirubin, POC Negative     Blood, UA Negative      Microscopic: 1-2 WBC, 2-4 squamous epithelial cells per HPF       ROS:  All systems reviewed and are negative except as documented in HPI and/or Assessment and Plan.     Patient Active Problem List:     Patient Active Problem List   Diagnosis    Right knee pain    Difficulty walking    Internal derangement of right knee    BMI 50.0-59.9, adult    Tear of lateral meniscus of right knee, current        Past Medical History:  Past Medical History:   Diagnosis Date    Asthma     Congenital heart disease     Diabetes mellitus type I     Hypertension     Respiratory distress     Stroke         Past Surgical History:  Past Surgical History:   Procedure Laterality Date     SECTION      CHOLECYSTECTOMY  2016    GALLBLADDER SURGERY      GERD surgery      KNEE ARTHROSCOPY W/ MENISCECTOMY Right 2023    Procedure: ARTHROSCOPY, KNEE, WITH partial lateral meniscectomy;  Surgeon: Barrie Corona MD;   "Location: [facility];  Service: Orthopedics;  Laterality: Right;  supine, leg younger, tourniquet    TONSILLECTOMY          Family History:  Family History   Problem Relation Age of Onset    Arthritis Mother     Asthma Mother     Diabetes Mother     Hypertension Mother     Heart disease Maternal Grandmother     Stroke Maternal Grandmother     Cancer Paternal Grandfather     Cancer Paternal Grandmother     Vision loss Paternal Grandmother     Arthritis Brother     Asthma Brother     Birth defects Brother     Depression Brother     Diabetes Brother     Hypertension Brother     Learning disabilities Brother     Mental illness Brother     Mental retardation Brother     Arthritis Maternal Aunt         Mainly whole family    Asthma Son         Social History:  Social History     Socioeconomic History    Marital status:    Tobacco Use    Smoking status: Never    Smokeless tobacco: Never   Substance and Sexual Activity    Alcohol use: Never    Drug use: Never    Sexual activity: Never   Social History Narrative    ** Merged History Encounter **             Allergies:  Review of patient's allergies indicates:   Allergen Reactions    Azithromycin     Bupropion     Choline,magnesium salicylate     Ciprofloxacin     Dulaglutide     Hydrochlorothiazide     Hydromorphone Itching     Other reaction(s): Injection site burning  has had demerol previously with itching, alleviated with benadryl.    Liraglutide     Peanut     Penicillins      Other reaction(s): vaginal burning    Sitagliptin     Sulfamethoxazole     Ziprasidone      Other reaction(s): "withdraw"    Cefixime Nausea Only        Objective:  Vitals:    08/11/23 1133   BP: 108/76   Pulse: 105   Resp: 20   Temp: 98.6 °F (37 °C)     General:  Well developed, well nourished adult female in no acute distress  Abdomen: Soft, nontender, no masses  Extremities:  No clubbing, cyanosis, or edema  Neurologic:  Grossly intact  Musculoskeletal:  Normal tone      Assessment:  1. " Incomplete bladder emptying  - POCT URINE DIPSTICK WITH MICROSCOPE, AUTOMATED    2. Urge incontinence  - POCT URINE DIPSTICK WITH MICROSCOPE, AUTOMATED    3. Recurrent cystitis  - POCT URINE DIPSTICK WITH MICROSCOPE, AUTOMATED  - Urine culture       Plan:  Observation of the incomplete bladder emptying.  She is going to stay off bethanechol.  Continue Myrbetriq and tolterodine.  Observation of the recurrent urinary tract infections.    Follow-up:    Three months.

## 2023-08-13 LAB — BACTERIA UR CULT: NO GROWTH

## 2023-08-16 ENCOUNTER — OFFICE VISIT (OUTPATIENT)
Dept: ORTHOPEDICS | Facility: CLINIC | Age: 33
End: 2023-08-16
Payer: MEDICAID

## 2023-08-16 ENCOUNTER — HOSPITAL ENCOUNTER (OUTPATIENT)
Dept: RADIOLOGY | Facility: HOSPITAL | Age: 33
Discharge: HOME | End: 2023-08-16
Attending: ORTHOPAEDIC SURGERY
Payer: MEDICAID

## 2023-08-16 VITALS — BODY MASS INDEX: 50.02 KG/M2 | WEIGHT: 293 LBS | HEIGHT: 64 IN

## 2023-08-16 DIAGNOSIS — M25.561 CHRONIC PAIN OF RIGHT KNEE: ICD-10-CM

## 2023-08-16 DIAGNOSIS — G89.29 CHRONIC PAIN OF RIGHT KNEE: ICD-10-CM

## 2023-08-16 DIAGNOSIS — S83.271D COMPLEX TEAR OF LATERAL MENISCUS OF RIGHT KNEE AS CURRENT INJURY, SUBSEQUENT ENCOUNTER: Primary | ICD-10-CM

## 2023-08-16 PROCEDURE — 3008F BODY MASS INDEX DOCD: CPT | Mod: CPTII,,, | Performed by: ORTHOPAEDIC SURGERY

## 2023-08-16 PROCEDURE — 99214 OFFICE O/P EST MOD 30 MIN: CPT | Mod: PBBFAC

## 2023-08-16 PROCEDURE — 73564 X-RAY EXAM KNEE 4 OR MORE: CPT | Mod: TC,RT

## 2023-08-16 PROCEDURE — 1159F MED LIST DOCD IN RCRD: CPT | Mod: CPTII,,, | Performed by: ORTHOPAEDIC SURGERY

## 2023-08-16 PROCEDURE — 99499 UNLISTED E&M SERVICE: CPT | Mod: S$PBB,,, | Performed by: ORTHOPAEDIC SURGERY

## 2023-08-16 PROCEDURE — 99499 NO LOS: ICD-10-PCS | Mod: S$PBB,,, | Performed by: ORTHOPAEDIC SURGERY

## 2023-08-16 PROCEDURE — 1159F PR MEDICATION LIST DOCUMENTED IN MEDICAL RECORD: ICD-10-PCS | Mod: CPTII,,, | Performed by: ORTHOPAEDIC SURGERY

## 2023-08-16 PROCEDURE — 3008F PR BODY MASS INDEX (BMI) DOCUMENTED: ICD-10-PCS | Mod: CPTII,,, | Performed by: ORTHOPAEDIC SURGERY

## 2023-08-16 NOTE — PROGRESS NOTES
[facility] Orthopaedic Surgery H&P Note  In brief, Katelyn Lopez is a 33 y.o. female who presents to clinic for evaluation for right knee pain.    HPI:  Patient is a 33-year-old female history of hypertension, poorly controlled diabetes, obesity, right knee meniscus tear status post arthroscopy with lateral meniscectomy on 2023.  Patient presents today for worsening right knee pain.  Patient states that she is been walking in her knee more and she had worsening swelling and pain in her knee.  Localizes pain overlying the lateral compartment.  No trauma or injury.    PMH:   Past Medical History:   Diagnosis Date    Asthma     Congenital heart disease     Diabetes mellitus type I     Hypertension     Respiratory distress     Stroke      PSH:   Past Surgical History:   Procedure Laterality Date     SECTION      CHOLECYSTECTOMY  2016    GALLBLADDER SURGERY      GERD surgery      KNEE ARTHROSCOPY W/ MENISCECTOMY Right 2023    Procedure: ARTHROSCOPY, KNEE, WITH partial lateral meniscectomy;  Surgeon: Barrie Corona MD;  Location: [facility];  Service: Orthopedics;  Laterality: Right;  supine, leg younger, tourniquet    TONSILLECTOMY       SH:   Social History     Socioeconomic History    Marital status:    Tobacco Use    Smoking status: Never    Smokeless tobacco: Never   Substance and Sexual Activity    Alcohol use: Never    Drug use: Never    Sexual activity: Never   Social History Narrative    ** Merged History Encounter **          FH:   Family History   Problem Relation Age of Onset    Arthritis Mother     Asthma Mother     Diabetes Mother     Hypertension Mother     Heart disease Maternal Grandmother     Stroke Maternal Grandmother     Cancer Paternal Grandfather     Cancer Paternal Grandmother     Vision loss Paternal Grandmother     Arthritis Brother     Asthma Brother     Birth defects Brother     Depression Brother     Diabetes Brother     Hypertension Brother     Learning disabilities Brother  "    Mental illness Brother     Mental retardation Brother     Arthritis Maternal Aunt         Mainly whole family    Asthma Son      Allergies:   Review of patient's allergies indicates:   Allergen Reactions    Azithromycin     Bupropion     Choline,magnesium salicylate     Ciprofloxacin     Dulaglutide     Hydrochlorothiazide     Hydromorphone Itching     Other reaction(s): Injection site burning  has had demerol previously with itching, alleviated with benadryl.    Liraglutide     Peanut     Penicillins      Other reaction(s): vaginal burning    Sitagliptin     Sulfamethoxazole     Ziprasidone      Other reaction(s): "withdraw"    Cefixime Nausea Only     ROS:  Constitutional- no fever, fatigue, weakness  Eye- no vision loss, eye redness, drainage, or pain  ENMT- no sore throat, ear pain, sinus pain/congestion, nasal congestion/drainage  Respiratory- no cough, wheezing, or shortness of breath  CV- no chest pain, no palpitations, no edema  GI- no N/V/D; no abdominal pain  Physical Exam:  There were no vitals filed for this visit.  General: NAD  Cardio: RRR by peripheral pulse  Pulm: Normal WOB on room air, symmetric chest rise  Abd: Soft, NT/ND  MSK:  Right lower extremity:  Valgus alignment  No open wounds, abrasions lacerations   Tenderness to palpation over the lateral compartment   Range of motion 0-90 degrees   stable varus/valgus stress   Motor intact EHL/FHL   Sensation intact to light touch distally   Foot warm well perfused  Imaging:   On independent review of standing right knee radiographs patient has mild joint space narrowing lateral compartment. No evidence of acute fracture dislocation.  Assessment/Plan:  Patient is a 33-year-old female history of hypertension, poorly controlled diabetes sugars in the 400s, obesity BMI 52.18 who presents to clinic for evaluation for atraumatic right knee pain.  Patient has early degenerative changes on radiographs.  Discussed lifestyle modification including weight " loss and better glycemic control  Unable to provide steroid injection today secondary to elevated sugars   Discussed weight loss   Refer patient to physical therapy   Follow up as needed    Cuba Meyers MD  [facility] Orthopaedic Surgery

## 2023-08-16 NOTE — PROGRESS NOTES
Faculty Attestation: Katelyn Lopez  was seen at [facility] in the Orthopaedic Clinic. History of Present Illness, Physical Exam, and Assessment and Plan reviewed. Treatment plan is reasonable and appropriate. Compliance with treatment recommendations is important. Discussed with the resident at the time of the visit.  No procedure was performed.     Hans Berger Jr, MD  Orthopaedic Surgery

## 2023-10-31 DIAGNOSIS — H66.90 OTITIS MEDIA: Primary | ICD-10-CM

## 2024-03-18 ENCOUNTER — OFFICE VISIT (OUTPATIENT)
Dept: UROLOGY | Facility: CLINIC | Age: 34
End: 2024-03-18
Payer: MEDICAID

## 2024-03-18 VITALS
TEMPERATURE: 98 F | SYSTOLIC BLOOD PRESSURE: 98 MMHG | OXYGEN SATURATION: 99 % | BODY MASS INDEX: 50.02 KG/M2 | WEIGHT: 293 LBS | HEIGHT: 64 IN | DIASTOLIC BLOOD PRESSURE: 71 MMHG | RESPIRATION RATE: 20 BRPM | HEART RATE: 104 BPM

## 2024-03-18 DIAGNOSIS — N39.41 URGE INCONTINENCE: Primary | ICD-10-CM

## 2024-03-18 DIAGNOSIS — R33.9 INCOMPLETE BLADDER EMPTYING: ICD-10-CM

## 2024-03-18 DIAGNOSIS — N30.90 RECURRENT CYSTITIS: ICD-10-CM

## 2024-03-18 LAB
BILIRUB SERPL-MCNC: NEGATIVE MG/DL
BLOOD URINE, POC: NEGATIVE
COLOR, POC UA: NORMAL
GLUCOSE UR QL STRIP: 500
KETONES UR QL STRIP: NORMAL
LEUKOCYTE ESTERASE URINE, POC: NORMAL
NITRITE, POC UA: NEGATIVE
PH, POC UA: 5.5
PROTEIN, POC: NEGATIVE
SPECIFIC GRAVITY, POC UA: 1.02
UROBILINOGEN, POC UA: 0.2

## 2024-03-18 PROCEDURE — 1160F RVW MEDS BY RX/DR IN RCRD: CPT | Mod: CPTII,,, | Performed by: UROLOGY

## 2024-03-18 PROCEDURE — 3008F BODY MASS INDEX DOCD: CPT | Mod: CPTII,,, | Performed by: UROLOGY

## 2024-03-18 PROCEDURE — 99215 OFFICE O/P EST HI 40 MIN: CPT | Mod: PBBFAC | Performed by: UROLOGY

## 2024-03-18 PROCEDURE — 3074F SYST BP LT 130 MM HG: CPT | Mod: CPTII,,, | Performed by: UROLOGY

## 2024-03-18 PROCEDURE — 99214 OFFICE O/P EST MOD 30 MIN: CPT | Mod: S$PBB,,, | Performed by: UROLOGY

## 2024-03-18 PROCEDURE — 3078F DIAST BP <80 MM HG: CPT | Mod: CPTII,,, | Performed by: UROLOGY

## 2024-03-18 PROCEDURE — 1159F MED LIST DOCD IN RCRD: CPT | Mod: CPTII,,, | Performed by: UROLOGY

## 2024-03-18 PROCEDURE — 81001 URINALYSIS AUTO W/SCOPE: CPT | Mod: PBBFAC | Performed by: UROLOGY

## 2024-03-18 RX ORDER — RAMELTEON 8 MG/1
8 TABLET ORAL NIGHTLY
COMMUNITY

## 2024-03-18 RX ORDER — DIAZEPAM 5 MG/1
2.5 TABLET ORAL EVERY 6 HOURS PRN
COMMUNITY

## 2024-03-18 RX ORDER — SPIRONOLACTONE 25 MG/1
25 TABLET ORAL DAILY
COMMUNITY

## 2024-03-18 RX ORDER — TOLTERODINE 4 MG/1
4 CAPSULE, EXTENDED RELEASE ORAL DAILY
Qty: 90 CAPSULE | Refills: 3 | Status: SHIPPED | OUTPATIENT
Start: 2024-03-18 | End: 2024-03-18

## 2024-03-18 RX ORDER — MIRABEGRON 50 MG/1
50 TABLET, EXTENDED RELEASE ORAL DAILY
Qty: 90 TABLET | Refills: 3 | Status: SHIPPED | OUTPATIENT
Start: 2024-03-18 | End: 2024-03-18

## 2024-03-18 RX ORDER — MIRABEGRON 50 MG/1
50 TABLET, EXTENDED RELEASE ORAL DAILY
Qty: 30 TABLET | Refills: 11 | Status: SHIPPED | OUTPATIENT
Start: 2024-03-18 | End: 2025-03-18

## 2024-03-18 RX ORDER — DULOXETIN HYDROCHLORIDE 60 MG/1
60 CAPSULE, DELAYED RELEASE ORAL DAILY
COMMUNITY

## 2024-03-18 RX ORDER — TOLTERODINE 4 MG/1
4 CAPSULE, EXTENDED RELEASE ORAL DAILY
Qty: 30 CAPSULE | Refills: 11 | Status: SHIPPED | OUTPATIENT
Start: 2024-03-18 | End: 2025-03-18

## 2024-03-18 NOTE — PROGRESS NOTES
CC:  Follow-up    HPI:  Katelyn Lopez is a 33 y.o. female here for follow-up.  She has urgency and urge incontinence.  She is on Myrbetriq and tolterodine which is helping.  She was having problems with recurrent urinary tract infections but she has not had problems with that recently.  She was found to have incomplete bladder emptying and was placed on bethanechol but she is stopped taking it and has been emptying the bladder well. She does have diabetes and also has gastroparesis.    Bladder scan residual:  29 ml    Urinalysis:    Results for orders placed or performed in visit on 24   POCT URINE DIPSTICK WITH MICROSCOPE, AUTOMATED   Result Value Ref Range    Color, UA Dark Yellow     Spec Grav UA 1.025     pH, UA 5.5     WBC, UA Small     Nitrite, UA Negative     Protein, POC Negative     Glucose,      Ketones, UA Trace     Urobilinogen, UA 0.2     Bilirubin, POC Negative     Blood, UA Negative      Microscopic Urinalysis:  WBC:   5-7 per HPF     RBC:    None per HPF     Bacteria:    None per HPF     Squamous epithelial cells:    1-2 per HPF      Crystals:   None    ROS:  All systems reviewed and are negative except as documented in HPI and/or Assessment and Plan.     Patient Active Problem List:     Patient Active Problem List   Diagnosis    Right knee pain    Difficulty walking    Internal derangement of right knee    BMI 50.0-59.9, adult    Tear of lateral meniscus of right knee, current        Past Medical History:  Past Medical History:   Diagnosis Date    Asthma     Congenital heart disease     Diabetes mellitus type I     Hypertension     Respiratory distress     Stroke         Past Surgical History:  Past Surgical History:   Procedure Laterality Date     SECTION      CHOLECYSTECTOMY  2016    GALLBLADDER SURGERY      GERD surgery      KNEE ARTHROSCOPY W/ MENISCECTOMY Right 2023    Procedure: ARTHROSCOPY, KNEE, WITH partial lateral meniscectomy;  Surgeon: Barrie Corona MD;   "Location: [facility] OR;  Service: Orthopedics;  Laterality: Right;  supine, leg younger, tourniquet    TONSILLECTOMY          Family History:  Family History   Problem Relation Age of Onset    Arthritis Mother     Asthma Mother     Diabetes Mother     Hypertension Mother     Heart disease Maternal Grandmother     Stroke Maternal Grandmother     Cancer Paternal Grandfather     Cancer Paternal Grandmother     Vision loss Paternal Grandmother     Arthritis Brother     Asthma Brother     Birth defects Brother     Depression Brother     Diabetes Brother     Hypertension Brother     Learning disabilities Brother     Mental illness Brother     Intellectual disability Brother     Arthritis Maternal Aunt         Mainly whole family    Asthma Son         Social History:  Social History     Socioeconomic History    Marital status:    Tobacco Use    Smoking status: Never    Smokeless tobacco: Never   Substance and Sexual Activity    Alcohol use: Never    Drug use: Never    Sexual activity: Never   Social History Narrative    ** Merged History Encounter **             Allergies:  Review of patient's allergies indicates:   Allergen Reactions    Azithromycin     Bupropion     Choline,magnesium salicylate     Ciprofloxacin     Dulaglutide     Hydrochlorothiazide     Hydromorphone Itching     Other reaction(s): Injection site burning  has had demerol previously with itching, alleviated with benadryl.    Liraglutide     Peanut     Penicillins      Other reaction(s): vaginal burning    Sitagliptin     Sulfamethoxazole     Ziprasidone      Other reaction(s): "withdraw"    Cefixime Nausea Only        Objective:  Vitals:    03/18/24 1449   BP: 98/71   Pulse: 104   Resp: 20   Temp: 98.4 °F (36.9 °C)     General:  Well developed, well nourished adult female in no acute distress  Abdomen: Soft, nontender, no masses  Extremities:  No clubbing, cyanosis, or edema  Neurologic:  Grossly intact  Musculoskeletal:  Normal tone    Assessment:  1. " Urge incontinence  - POCT URINE DIPSTICK WITH MICROSCOPE, AUTOMATED  - mirabegron (MYRBETRIQ) 50 mg Tb24; Take 1 tablet (50 mg total) by mouth once daily.  Dispense: 30 tablet; Refill: 11  - tolterodine (DETROL LA) 4 MG 24 hr capsule; Take 1 capsule (4 mg total) by mouth once daily.  Dispense: 30 capsule; Refill: 11    2. Incomplete bladder emptying    3. Recurrent cystitis       Plan:  Continue Myrbetriq and tolterodine.  Refills given.  She is emptying the bladder well currently so we will continue to watch this.  She has not had an infection recently so we will continue observation this.    Follow-up:    One year for bladder scan.

## 2024-04-18 ENCOUNTER — HOSPITAL ENCOUNTER (EMERGENCY)
Facility: HOSPITAL | Age: 34
Discharge: HOME | End: 2024-04-18
Attending: GENERAL PRACTICE
Payer: MEDICAID

## 2024-04-18 VITALS
HEIGHT: 64 IN | BODY MASS INDEX: 49.85 KG/M2 | SYSTOLIC BLOOD PRESSURE: 108 MMHG | WEIGHT: 292 LBS | HEART RATE: 99 BPM | RESPIRATION RATE: 19 BRPM | DIASTOLIC BLOOD PRESSURE: 79 MMHG | OXYGEN SATURATION: 100 % | TEMPERATURE: 98 F

## 2024-04-18 DIAGNOSIS — N39.0 URINARY TRACT INFECTION WITHOUT HEMATURIA, SITE UNSPECIFIED: ICD-10-CM

## 2024-04-18 DIAGNOSIS — R73.9 HYPERGLYCEMIA: Primary | ICD-10-CM

## 2024-04-18 LAB
ALBUMIN SERPL-MCNC: 3.6 G/DL (ref 3.5–5)
ALBUMIN/GLOB SERPL: 0.9 RATIO (ref 1.1–2)
ALP SERPL-CCNC: 140 UNIT/L (ref 40–150)
ALT SERPL-CCNC: 17 UNIT/L (ref 0–55)
AMPHET UR QL SCN: NEGATIVE
APPEARANCE UR: ABNORMAL
AST SERPL-CCNC: 15 UNIT/L (ref 5–34)
B-HCG SERPL QL: NEGATIVE
BACTERIA #/AREA URNS AUTO: ABNORMAL /HPF
BARBITURATE SCN PRESENT UR: POSITIVE
BASOPHILS # BLD AUTO: 0.07 X10(3)/MCL
BASOPHILS NFR BLD AUTO: 0.8 %
BENZODIAZ UR QL SCN: NEGATIVE
BILIRUB SERPL-MCNC: 0.3 MG/DL
BILIRUB UR QL STRIP.AUTO: NEGATIVE
BNP BLD-MCNC: <10 PG/ML
BUN SERPL-MCNC: 11 MG/DL (ref 7–18.7)
CALCIUM SERPL-MCNC: 9.6 MG/DL (ref 8.4–10.2)
CANNABINOIDS UR QL SCN: NEGATIVE
CHLORIDE SERPL-SCNC: 103 MMOL/L (ref 98–107)
CK MB SERPL-MCNC: <1 NG/ML
CK SERPL-CCNC: 79 U/L (ref 29–168)
CO2 SERPL-SCNC: 25 MMOL/L (ref 22–29)
COCAINE UR QL SCN: NEGATIVE
COLOR UR AUTO: YELLOW
CREAT SERPL-MCNC: 1.13 MG/DL (ref 0.55–1.02)
EOSINOPHIL # BLD AUTO: 0.2 X10(3)/MCL (ref 0–0.9)
EOSINOPHIL NFR BLD AUTO: 2.3 %
ERYTHROCYTE [DISTWIDTH] IN BLOOD BY AUTOMATED COUNT: 12.9 % (ref 11.5–17)
FENTANYL UR QL SCN: NEGATIVE
GFR SERPLBLD CREATININE-BSD FMLA CKD-EPI: >60 MLS/MIN/1.73/M2
GLOBULIN SER-MCNC: 3.9 GM/DL (ref 2.4–3.5)
GLUCOSE SERPL-MCNC: 290 MG/DL (ref 74–100)
GLUCOSE UR QL STRIP.AUTO: ABNORMAL
HCT VFR BLD AUTO: 39.4 % (ref 37–47)
HGB BLD-MCNC: 13 G/DL (ref 12–16)
IMM GRANULOCYTES # BLD AUTO: 0.01 X10(3)/MCL (ref 0–0.04)
IMM GRANULOCYTES NFR BLD AUTO: 0.1 %
KETONES UR QL STRIP.AUTO: NEGATIVE
LEUKOCYTE ESTERASE UR QL STRIP.AUTO: ABNORMAL
LYMPHOCYTES # BLD AUTO: 3.13 X10(3)/MCL (ref 0.6–4.6)
LYMPHOCYTES NFR BLD AUTO: 36.5 %
MCH RBC QN AUTO: 28 PG (ref 27–31)
MCHC RBC AUTO-ENTMCNC: 33 G/DL (ref 33–36)
MCV RBC AUTO: 84.7 FL (ref 80–94)
MDMA UR QL SCN: NEGATIVE
MONOCYTES # BLD AUTO: 0.64 X10(3)/MCL (ref 0.1–1.3)
MONOCYTES NFR BLD AUTO: 7.5 %
NEUTROPHILS # BLD AUTO: 4.52 X10(3)/MCL (ref 2.1–9.2)
NEUTROPHILS NFR BLD AUTO: 52.8 %
NITRITE UR QL STRIP.AUTO: NEGATIVE
NRBC BLD AUTO-RTO: 0 %
OPIATES UR QL SCN: NEGATIVE
PCP UR QL: NEGATIVE
PH UR STRIP.AUTO: 6 [PH]
PH UR: 6 [PH] (ref 3–11)
PLATELET # BLD AUTO: 322 X10(3)/MCL (ref 130–400)
PMV BLD AUTO: 9.7 FL (ref 7.4–10.4)
POCT GLUCOSE: 185 MG/DL (ref 70–110)
POCT GLUCOSE: 298 MG/DL (ref 70–110)
POTASSIUM SERPL-SCNC: 4 MMOL/L (ref 3.5–5.1)
PROT SERPL-MCNC: 7.5 GM/DL (ref 6.4–8.3)
PROT UR QL STRIP.AUTO: NEGATIVE
RBC # BLD AUTO: 4.65 X10(6)/MCL (ref 4.2–5.4)
RBC #/AREA URNS AUTO: ABNORMAL /HPF
RBC UR QL AUTO: NEGATIVE
SODIUM SERPL-SCNC: 136 MMOL/L (ref 136–145)
SP GR UR STRIP.AUTO: 1.02 (ref 1–1.03)
SPECIFIC GRAVITY, URINE AUTO (.000) (OHS): 1.02 (ref 1–1.03)
SQUAMOUS #/AREA URNS AUTO: ABNORMAL /HPF
TROPONIN I SERPL-MCNC: <0.01 NG/ML (ref 0–0.04)
TSH SERPL-ACNC: 1.24 UIU/ML (ref 0.35–4.94)
UROBILINOGEN UR STRIP-ACNC: 0.2
WBC # SPEC AUTO: 8.57 X10(3)/MCL (ref 4.5–11.5)
WBC #/AREA URNS AUTO: ABNORMAL /HPF
WBC CLUMPS UR QL AUTO: ABNORMAL

## 2024-04-18 PROCEDURE — 80053 COMPREHEN METABOLIC PANEL: CPT | Performed by: GENERAL PRACTICE

## 2024-04-18 PROCEDURE — 85025 COMPLETE CBC W/AUTO DIFF WBC: CPT | Performed by: GENERAL PRACTICE

## 2024-04-18 PROCEDURE — 83880 ASSAY OF NATRIURETIC PEPTIDE: CPT | Performed by: GENERAL PRACTICE

## 2024-04-18 PROCEDURE — 99285 EMERGENCY DEPT VISIT HI MDM: CPT | Mod: 25

## 2024-04-18 PROCEDURE — 80307 DRUG TEST PRSMV CHEM ANLYZR: CPT | Performed by: GENERAL PRACTICE

## 2024-04-18 PROCEDURE — 84443 ASSAY THYROID STIM HORMONE: CPT | Performed by: GENERAL PRACTICE

## 2024-04-18 PROCEDURE — 82553 CREATINE MB FRACTION: CPT | Performed by: GENERAL PRACTICE

## 2024-04-18 PROCEDURE — 99900031 HC PATIENT EDUCATION (STAT)

## 2024-04-18 PROCEDURE — 82550 ASSAY OF CK (CPK): CPT | Performed by: GENERAL PRACTICE

## 2024-04-18 PROCEDURE — 84484 ASSAY OF TROPONIN QUANT: CPT | Performed by: GENERAL PRACTICE

## 2024-04-18 PROCEDURE — 25000003 PHARM REV CODE 250: Performed by: GENERAL PRACTICE

## 2024-04-18 PROCEDURE — 87086 URINE CULTURE/COLONY COUNT: CPT | Performed by: GENERAL PRACTICE

## 2024-04-18 PROCEDURE — 82962 GLUCOSE BLOOD TEST: CPT

## 2024-04-18 PROCEDURE — 93005 ELECTROCARDIOGRAM TRACING: CPT

## 2024-04-18 PROCEDURE — 81001 URINALYSIS AUTO W/SCOPE: CPT | Mod: XB | Performed by: GENERAL PRACTICE

## 2024-04-18 PROCEDURE — 81025 URINE PREGNANCY TEST: CPT | Performed by: GENERAL PRACTICE

## 2024-04-18 RX ORDER — ROPINIROLE 0.25 MG/1
0.25 TABLET, FILM COATED ORAL NIGHTLY
COMMUNITY

## 2024-04-18 RX ORDER — METFORMIN HYDROCHLORIDE 500 MG/1
TABLET, EXTENDED RELEASE ORAL
COMMUNITY
Start: 2024-02-20

## 2024-04-18 RX ORDER — VALACYCLOVIR HYDROCHLORIDE 500 MG/1
500 TABLET, FILM COATED ORAL
COMMUNITY
Start: 2024-03-18

## 2024-04-18 RX ORDER — ISOSORBIDE MONONITRATE 20 MG/1
10 TABLET ORAL NIGHTLY
COMMUNITY
Start: 2024-04-15

## 2024-04-18 RX ORDER — FLUCONAZOLE 150 MG/1
150 TABLET ORAL ONCE
COMMUNITY
Start: 2024-03-18

## 2024-04-18 RX ORDER — PREDNISOLONE ACETATE 10 MG/ML
1 SUSPENSION/ DROPS OPHTHALMIC 4 TIMES DAILY
COMMUNITY
Start: 2024-04-04

## 2024-04-18 RX ORDER — NITROFURANTOIN 25; 75 MG/1; MG/1
100 CAPSULE ORAL
Status: COMPLETED | OUTPATIENT
Start: 2024-04-18 | End: 2024-04-18

## 2024-04-18 RX ORDER — TENAPANOR HYDROCHLORIDE 53.2 MG/1
1 TABLET ORAL 2 TIMES DAILY
COMMUNITY
Start: 2024-03-24

## 2024-04-18 RX ORDER — MONTELUKAST SODIUM 10 MG/1
10 TABLET ORAL EVERY MORNING
COMMUNITY
Start: 2024-04-13

## 2024-04-18 RX ORDER — OLANZAPINE AND SAMIDORPHAN L-MALATE 10; 10 MG/1; MG/1
1 TABLET, FILM COATED ORAL NIGHTLY
COMMUNITY
Start: 2024-03-18

## 2024-04-18 RX ORDER — LURASIDONE HYDROCHLORIDE 40 MG/1
TABLET, FILM COATED ORAL
COMMUNITY
Start: 2024-04-09

## 2024-04-18 RX ORDER — SYRINGE-NEEDLE,INSULIN,0.5 ML 30 GX5/16"
SYRINGE, EMPTY DISPOSABLE MISCELLANEOUS
COMMUNITY
Start: 2024-04-02

## 2024-04-18 RX ORDER — SEMAGLUTIDE 1.34 MG/ML
INJECTION, SOLUTION SUBCUTANEOUS
COMMUNITY
Start: 2024-03-22

## 2024-04-18 RX ORDER — DILTIAZEM HYDROCHLORIDE 240 MG/1
CAPSULE, EXTENDED RELEASE ORAL
COMMUNITY
Start: 2024-04-13

## 2024-04-18 RX ORDER — INSULIN ASPART INJECTION 100 [IU]/ML
INJECTION, SOLUTION SUBCUTANEOUS
COMMUNITY
Start: 2024-03-31

## 2024-04-18 RX ORDER — FLUDROCORTISONE ACETATE 0.1 MG/1
100 TABLET ORAL EVERY MORNING
COMMUNITY
Start: 2024-04-13

## 2024-04-18 RX ORDER — LACTULOSE 10 G/15ML
15 SOLUTION ORAL; RECTAL 2 TIMES DAILY
COMMUNITY
Start: 2024-03-04

## 2024-04-18 RX ORDER — LEVOCETIRIZINE DIHYDROCHLORIDE 5 MG/1
5 TABLET, FILM COATED ORAL
COMMUNITY
Start: 2024-04-13

## 2024-04-18 RX ORDER — BLOOD-GLUCOSE SENSOR
EACH MISCELLANEOUS
COMMUNITY
Start: 2024-03-25

## 2024-04-18 RX ORDER — NITROFURANTOIN 25; 75 MG/1; MG/1
100 CAPSULE ORAL 2 TIMES DAILY
Qty: 14 CAPSULE | Refills: 0 | Status: SHIPPED | OUTPATIENT
Start: 2024-04-18 | End: 2024-04-25

## 2024-04-18 RX ORDER — BUTALBITAL, ACETAMINOPHEN AND CAFFEINE 50; 325; 40 MG/1; MG/1; MG/1
TABLET ORAL
COMMUNITY
Start: 2024-04-09

## 2024-04-18 RX ADMIN — NITROFURANTOIN MONOHYDRATE/MACROCRYSTALS 100 MG: 25; 75 CAPSULE ORAL at 08:04

## 2024-04-18 NOTE — ED PROVIDER NOTES
"Encounter Date: 2024       History     Chief Complaint   Patient presents with    Hyperglycemia     Pt to Ed with c/o hyperglycemia - highest 490 yesterday. Reports weakness, sob. (Pt was collecting UA, advised us of SOB/CP upon being triaged)      Pt to Ed with c/o hyperglycemia - highest 490 yesterday. Reports weakness, sob. (Pt was collecting UA, advised us of SOB/CP upon being triaged)    The history is provided by the patient.     Review of patient's allergies indicates:   Allergen Reactions    Azithromycin     Bupropion     Choline,magnesium salicylate     Ciprofloxacin     Dulaglutide     Hydrochlorothiazide     Hydromorphone Itching     Other reaction(s): Injection site burning  has had demerol previously with itching, alleviated with benadryl.    Liraglutide     Peanut     Penicillins      Other reaction(s): vaginal burning    Sitagliptin     Sulfamethoxazole     Ziprasidone      Other reaction(s): "withdraw"    Cefixime Nausea Only     Past Medical History:   Diagnosis Date    Asthma     Congenital heart disease     Diabetes mellitus type I     Hypertension     Respiratory distress     Stroke      Past Surgical History:   Procedure Laterality Date     SECTION      CHOLECYSTECTOMY  2016    GALLBLADDER SURGERY      GERD surgery      KNEE ARTHROSCOPY W/ MENISCECTOMY Right 2023    Procedure: ARTHROSCOPY, KNEE, WITH partial lateral meniscectomy;  Surgeon: Barrie Corona MD;  Location: [facility];  Service: Orthopedics;  Laterality: Right;  supine, leg younger, tourniquet    TONSILLECTOMY       Family History   Problem Relation Name Age of Onset    Arthritis Mother Madie Kim     Asthma Mother Madie Kim     Diabetes Mother Madie Kim     Hypertension Mother Madie Kim     Heart disease Maternal Grandmother Vida Lopez     Stroke Maternal Grandmother Vida John     Cancer Paternal Grandfather Augie South     Cancer Paternal Grandmother Vida Dia     Vision loss Paternal Grandmother " Vida Dia     Arthritis Brother Sam Lopez     Asthma Brother Sam Lopez     Birth defects Brother Sam Lopez     Depression Brother Sam Lopez     Diabetes Brother Sam Lopez     Hypertension Brother Sam Lopez     Learning disabilities Brother Sam Lopez     Mental illness Brother Sam Lopez     Intellectual disability Brother Sam Lopez     Arthritis Maternal Aunt Solitario Tavares         Mainly whole family    Asthma Son Howard Miller Jr      Social History     Tobacco Use    Smoking status: Never    Smokeless tobacco: Never   Substance Use Topics    Alcohol use: Never    Drug use: Never     Review of Systems   Constitutional:  Positive for fatigue.   HENT: Negative.     Eyes: Negative.    Respiratory:  Positive for chest tightness and shortness of breath.    Cardiovascular:  Positive for chest pain.   Gastrointestinal: Negative.    Endocrine: Negative.    Genitourinary: Negative.    Musculoskeletal: Negative.    Skin: Negative.    Allergic/Immunologic: Negative.    Neurological: Negative.    Hematological: Negative.    Psychiatric/Behavioral: Negative.     All other systems reviewed and are negative.      Physical Exam     Initial Vitals   BP Pulse Resp Temp SpO2   04/18/24 1805 04/18/24 1805 04/18/24 1805 04/18/24 1809 04/18/24 1805   (!) 146/94 (!) 115 18 98.3 °F (36.8 °C) 99 %      MAP       --                Physical Exam    Nursing note and vitals reviewed.  Constitutional: She appears well-developed and well-nourished.   HENT:   Head: Normocephalic and atraumatic.   Mouth/Throat: Oropharynx is clear and moist.   Eyes: EOM are normal. Pupils are equal, round, and reactive to light.   Neck: Neck supple.   Normal range of motion.  Cardiovascular:  Normal rate, regular rhythm, normal heart sounds and intact distal pulses.           Pulmonary/Chest: Breath sounds normal.   Abdominal: Abdomen is soft. Bowel sounds are normal.   Musculoskeletal:         General: Normal  range of motion.      Cervical back: Normal range of motion and neck supple.     Neurological: She is alert and oriented to person, place, and time. She has normal strength. GCS score is 15. GCS eye subscore is 4. GCS verbal subscore is 5. GCS motor subscore is 6.   Skin: Skin is warm and dry.   Psychiatric: She has a normal mood and affect. Her behavior is normal. Judgment and thought content normal.         ED Course   Procedures  Labs Reviewed   COMPREHENSIVE METABOLIC PANEL - Abnormal; Notable for the following components:       Result Value    Glucose Level 290 (*)     Creatinine 1.13 (*)     Globulin 3.9 (*)     Albumin/Globulin Ratio 0.9 (*)     All other components within normal limits   URINALYSIS, REFLEX TO URINE CULTURE - Abnormal; Notable for the following components:    Appearance, UA SL CLOUDY (*)     Glucose, UA 3+ (*)     Leukocyte Esterase, UA 1+ (*)     All other components within normal limits   DRUG SCREEN, URINE (BEAKER) - Abnormal; Notable for the following components:    Barbituates, Urine Positive (*)     All other components within normal limits    Narrative:     Cut off concentrations:    Amphetamines - 1000 ng/ml  Barbiturates - 200 ng/ml  Benzodiazepine - 200 ng/ml  Cannabinoids (THC) - 50 ng/ml  Cocaine - 300 ng/ml  Fentanyl - 1.0 ng/ml  MDMA - 500 ng/ml  Opiates - 300 ng/ml   Phencyclidine (PCP) - 25 ng/ml    Specimen submitted for drug analysis and tested for pH and specific gravity in order to evaluate sample integrity. Suspect tampering if specific gravity is <1.003 and/or pH is not within the range of 4.5 - 8.0  False negatives may result form substances such as bleach added to urine.  False positives may result for the presence of a substance with similar chemical structure to the drug or its metabolite.    This test provides only a PRELIMINARY analytical test result. A more specific alternate chemical method must be used in order to obtain a confirmed analytical result. Gas  chromatography/mass spectrometry (GC/MS) is the preferred confirmatory method. Other chemical confirmation methods are available. Clinical consideration and professional judgement should be applied to any drug of abuse test result, particularly when preliminary positive results are used.    Positive results will be confirmed only at the physicians request. Unconfirmed screening results are to be used only for medical purposes (treatment).        URINALYSIS, MICROSCOPIC - Abnormal; Notable for the following components:    Bacteria, UA Few (*)     WBC Clumps, UA Few (*)     WBC, UA 50-99 (*)     Squamous Epithelial Cells, UA Few (*)     All other components within normal limits   POCT GLUCOSE - Abnormal; Notable for the following components:    POCT Glucose 298 (*)     All other components within normal limits   TROPONIN I - Normal   CK - Normal   CK-MB - Normal   B-TYPE NATRIURETIC PEPTIDE - Normal   TSH - Normal   PREGNANCY TEST, URINE RAPID - Normal   CULTURE, URINE   CBC W/ AUTO DIFFERENTIAL    Narrative:     The following orders were created for panel order CBC auto differential.  Procedure                               Abnormality         Status                     ---------                               -----------         ------                     CBC with Differential[[id number]]                           Final result                 Please view results for these tests on the individual orders.   CBC WITH DIFFERENTIAL     EKG Readings: (Independently Interpreted)   Rhythm: Sinus Tachycardia. Heart Rate: 113. Ectopy: No Ectopy. Conduction: Normal. ST Segments: Normal ST Segments. Axis: Normal. Q Waves: V1 and V2. Clinical Impression: Sinus Tachycardia     ECG Results              EKG 12-lead (In process)        Collection Time Result Time QRS Duration OHS QTC Calculation    04/18/24 18:06:20 04/18/24 18:40:42 72 436                     In process by Interface, Lab In [facility] (04/18/24 18:40:49)                    Narrative:    Test Reason : R07.9,    Vent. Rate : 113 BPM     Atrial Rate : 113 BPM     P-R Int : 150 ms          QRS Dur : 072 ms      QT Int : 318 ms       P-R-T Axes : 038 046 022 degrees     QTc Int : 436 ms    Sinus tachycardia  Cannot rule out Anterior infarct ,age undetermined  Abnormal ECG  No previous ECGs available    Referred By: [email or web address]   SELF           Confirmed By:                                   Imaging Results              X-Ray Chest PA And Lateral (Final result)  Result time 04/18/24 18:51:25      Final result by Rebecca Guan MD (04/18/24 18:51:25)                   Impression:      No acute abnormality of the chest.      Electronically signed by: Rebecca Guan  Date:    04/18/2024  Time:    18:51               Narrative:    EXAMINATION:  XR CHEST PA AND LATERAL    CLINICAL HISTORY:  Chest Pain;    TECHNIQUE:  PA and lateral chest radiographs    COMPARISON:  Chest x-ray dated 12/18/2018    FINDINGS:  The heart is normal in size.  The lungs are clear.  There is no pleural effusion or visible pneumothorax.                                       Medications   insulin regular injection 6 Units 0.06 mL (has no administration in time range)   nitrofurantoin (macrocrystal-monohydrate) 100 MG capsule 100 mg (has no administration in time range)     Medical Decision Making  Patient is likely experiencing hyperglycemia secondary to a urinary tract infection.  We will treat the patient here, and give the patient small dose of subcu insulin.  I have advised the patient follow up with her endocrinologist as well as her primary care physician.    Amount and/or Complexity of Data Reviewed  Labs: ordered.  Radiology: ordered.                                      Clinical Impression:  Final diagnoses:  [R73.9] Hyperglycemia (Primary)  [N39.0] Urinary tract infection without hematuria, site unspecified          ED Disposition Condition    Discharge Stable          ED Prescriptions        Medication Sig Dispense Start Date End Date Auth. Provider    nitrofurantoin, macrocrystal-monohydrate, (MACROBID) 100 MG capsule Take 1 capsule (100 mg total) by mouth 2 (two) times daily. for 7 days 14 capsule 4/18/2024 4/25/2024 Oracio Forbes MD          Follow-up Information       Follow up With Specialties Details Why Contact Info    Barrie Marmolejo NP Family Medicine Call in 2 days  [address] [place] [address]  [phone]               Oracio Forbes MD  04/18/24 1956

## 2024-04-18 NOTE — ED NOTES
Pt ambulated to room 2 w/steady gait. Pt c/o hyperglycemia for the past two days. Pt states yesterday and today her BG was 490, pt gave her self insulin at home and BG PTA was 380. BG checked in room and was 298. Pt never mentioned SOB when checking in, pt reported feeling SOB while in room 2. EKG was performed right away.

## 2024-04-19 LAB
OHS QRS DURATION: 72 MS
OHS QTC CALCULATION: 436 MS

## 2024-04-21 LAB
BACTERIA UR CULT: ABNORMAL

## 2024-05-16 ENCOUNTER — OFFICE VISIT (OUTPATIENT)
Dept: OTOLARYNGOLOGY | Facility: CLINIC | Age: 34
End: 2024-05-16
Payer: MEDICAID

## 2024-05-16 VITALS
DIASTOLIC BLOOD PRESSURE: 82 MMHG | HEIGHT: 64 IN | WEIGHT: 290 LBS | SYSTOLIC BLOOD PRESSURE: 114 MMHG | HEART RATE: 114 BPM | TEMPERATURE: 99 F | BODY MASS INDEX: 49.51 KG/M2

## 2024-05-16 DIAGNOSIS — J34.89 NASAL DRYNESS: ICD-10-CM

## 2024-05-16 DIAGNOSIS — H92.09 OTALGIA, UNSPECIFIED LATERALITY: ICD-10-CM

## 2024-05-16 DIAGNOSIS — R09.81 NASAL CONGESTION: ICD-10-CM

## 2024-05-16 DIAGNOSIS — M26.623 BILATERAL TEMPOROMANDIBULAR JOINT PAIN: Primary | ICD-10-CM

## 2024-05-16 DIAGNOSIS — M79.18 MYOFASCIAL PAIN: ICD-10-CM

## 2024-05-16 PROCEDURE — 99215 OFFICE O/P EST HI 40 MIN: CPT | Mod: PBBFAC | Performed by: NURSE PRACTITIONER

## 2024-05-16 PROCEDURE — 99204 OFFICE O/P NEW MOD 45 MIN: CPT | Mod: S$PBB,,, | Performed by: NURSE PRACTITIONER

## 2024-05-16 PROCEDURE — 3079F DIAST BP 80-89 MM HG: CPT | Mod: CPTII,,, | Performed by: NURSE PRACTITIONER

## 2024-05-16 PROCEDURE — 3008F BODY MASS INDEX DOCD: CPT | Mod: CPTII,,, | Performed by: NURSE PRACTITIONER

## 2024-05-16 PROCEDURE — 3074F SYST BP LT 130 MM HG: CPT | Mod: CPTII,,, | Performed by: NURSE PRACTITIONER

## 2024-05-16 PROCEDURE — 1159F MED LIST DOCD IN RCRD: CPT | Mod: CPTII,,, | Performed by: NURSE PRACTITIONER

## 2024-05-16 RX ORDER — ASPIRIN 325 MG
50000 TABLET, DELAYED RELEASE (ENTERIC COATED) ORAL
COMMUNITY
Start: 2024-04-24

## 2024-05-16 RX ORDER — PANTOPRAZOLE SODIUM 40 MG/1
40 TABLET, DELAYED RELEASE ORAL
COMMUNITY
Start: 2024-05-15

## 2024-05-16 RX ORDER — BLOOD-GLUCOSE TRANSMITTER
EACH MISCELLANEOUS
COMMUNITY
Start: 2024-05-01

## 2024-05-16 RX ORDER — SUVOREXANT 5 MG/1
1 TABLET, FILM COATED ORAL NIGHTLY PRN
COMMUNITY
Start: 2024-05-15

## 2024-05-16 RX ORDER — PROMETHAZINE HYDROCHLORIDE 25 MG/1
25 TABLET ORAL EVERY 6 HOURS PRN
COMMUNITY
Start: 2024-05-15

## 2024-05-16 RX ORDER — DICYCLOMINE HYDROCHLORIDE 20 MG/1
20 TABLET ORAL EVERY 6 HOURS PRN
COMMUNITY
Start: 2024-04-26

## 2024-05-16 RX ORDER — BREXPIPRAZOLE 2 MG/1
1 TABLET ORAL
COMMUNITY
Start: 2024-05-14

## 2024-05-16 NOTE — PROGRESS NOTES
"[facility]  Otolaryngology Clinic Note    Katelyn Lopez  YOB: 1990    Chief Complaint:   Chief Complaint   Patient presents with    referral: Otitis Media        HPI: 2024: 34 y.o. female presents with ear and nose concerns. States her ears often hurt & she is told by PCP that she has fluid in them. Denies HL. Reports she was supposed to get PE tubes as a child but that her mother did not pursue this. Admits to grinding her teeth. She uses flonase and astelin once daily for nasal congestion. Does not feel that flonase does anything but astelin helps somewhat. Denies rhinitis, PND, or facial pressure. Has recently been on abx for sinus infections without benefit. States she had outside MRI which showed bad sinuses. Has never used saline spray or rinses. Reports nose is often dry and she pulls out dry, bloody crusts.     ROS:   10-point review of systems negative except per HPI      Review of patient's allergies indicates:   Allergen Reactions    Azithromycin     Bupropion     Choline,magnesium salicylate     Ciprofloxacin     Dulaglutide     Hydrochlorothiazide     Hydromorphone Itching     Other reaction(s): Injection site burning  has had demerol previously with itching, alleviated with benadryl.    Liraglutide     Peanut     Penicillins      Other reaction(s): vaginal burning    Sitagliptin     Sulfamethoxazole     Ziprasidone      Other reaction(s): "withdraw"    Cefixime Nausea Only       Past Medical History:   Diagnosis Date    Asthma     CHF (congestive heart failure) 3 or 4 years ago    Congenital heart disease     Diabetes mellitus type I     Food allergy 2016    GERD (gastroesophageal reflux disease)     Hypertension     Mental disorder 2006    Anxiety    Migraine headache 3 yesrs    Obesity Kid    Respiratory distress     Seasonal allergies 2018    Peanuts    Sleep apnea 2018    Stroke        Past Surgical History:   Procedure Laterality Date     SECTION  "     CHOLECYSTECTOMY  2016    GALLBLADDER SURGERY      GERD surgery      KNEE ARTHROSCOPY W/ MENISCECTOMY Right 04/11/2023    Procedure: ARTHROSCOPY, KNEE, WITH partial lateral meniscectomy;  Surgeon: Barrie Corona MD;  Location: [facility];  Service: Orthopedics;  Laterality: Right;  supine, leg younger, tourniquet    KNEE SURGERY  2023    Right knee surgery    TONSILLECTOMY         Social History     Socioeconomic History    Marital status:    Tobacco Use    Smoking status: Never    Smokeless tobacco: Never   Substance and Sexual Activity    Alcohol use: Never    Drug use: Never    Sexual activity: Never   Social History Narrative    ** Merged History Encounter **            Family History   Problem Relation Name Age of Onset    Arthritis Mother Madie Kim     Asthma Mother Madie Kim     Diabetes Mother Madie Kim     Hypertension Mother Madie Kim     Heart disease Maternal Grandmother Vida Lopez     Stroke Maternal Grandmother Vida Lopez     Cancer Paternal Grandfather Augie Dia     Cancer Paternal Grandmother Vida Dia     Vision loss Paternal Grandmother Vida Dia     Arthritis Brother Sam Lopez     Asthma Brother Sam Lopez     Birth defects Brother Sam Lopez     Depression Brother Sam Lopez     Diabetes Brother Sam Lopez     Hypertension Brother Sam John     Learning disabilities Brother Sam Lopez     Mental illness Brother Sam John     Intellectual disability Brother Sam John     Arthritis Maternal Aunt Kirsten John, Solitario Lopez         Mainly whole family    Asthma Son Howard Miller Jr        Outpatient Encounter Medications as of 5/16/2024   Medication Sig Dispense Refill    ADVAIR DISKUS 100-50 mcg/dose diskus inhaler 1 puff 2 (two) times daily.      albuterol (PROVENTIL/VENTOLIN HFA) 90 mcg/actuation inhaler 1 puff every 3 (three) hours as needed.      ARIPiprazole (ABILIFY) 5 MG Tab Take 5 mg by mouth.      atorvastatin (LIPITOR) 10 MG  tablet Take 10 mg by mouth once daily.      azelastine (ASTELIN) 137 mcg (0.1 %) nasal spray 2 sprays once daily.      BELSOMRA 5 mg Tab Take 1 tablet by mouth nightly as needed.      bethanechol (URECHOLINE) 25 MG Tab Take 1 tablet (25 mg total) by mouth 3 (three) times daily. 90 tablet 1    bumetanide (BUMEX) 2 MG tablet Take 2 mg by mouth once daily.      cholecalciferol, vitamin D3, 1,250 mcg (50,000 unit) capsule Take 50,000 Units by mouth every 7 days.      clonazePAM (KLONOPIN) 0.5 MG tablet Take 0.5 mg by mouth 2 (two) times daily as needed for Anxiety.      DEXCOM G6 SENSOR Scarlett SMARTSIG:Topical Every 10 Days      DEXCOM G6 TRANSMITTER Scarlett CHANGE TRANSMITTOR EVERY 90 DAYS      diazePAM (VALIUM) 5 MG tablet Take 2.5 mg by mouth every 6 (six) hours as needed for Anxiety.      dicyclomine (BENTYL) 20 mg tablet Take 20 mg by mouth every 6 (six) hours as needed.      DULoxetine (CYMBALTA) 60 MG capsule Take 60 mg by mouth once daily.      ESGIC -40 mg per tablet Take 1 tablet every day by oral route for 15 days.      famotidine (PEPCID) 40 MG tablet Take 40 mg by mouth once daily.      FIASP U-100 INSULIN 100 unit/mL Soln SMARTSIG:160 Unit(s) SUB-Q Daily      fluconazole (DIFLUCAN) 150 MG Tab Take 150 mg by mouth once.      fludrocortisone (FLORINEF) 0.1 mg Tab Take 100 mcg by mouth every morning.      glipiZIDE (GLUCOTROL) 10 MG tablet Take 20 mg by mouth 2 (two) times daily.      IBSRELA 50 mg Tab Take 1 tablet by mouth 2 (two) times daily.      isosorbide mononitrate (ISMO,MONOKET) 20 MG Tab Take 10 mg by mouth every evening.      ketoconazole (NIZORAL) 2 % shampoo Apply topically every 7 days.      ketorolac (TORADOL) 10 mg tablet Take 10 mg by mouth 3 (three) times daily.      lactulose (CHRONULAC) 10 gram/15 mL solution Take 15 mLs by mouth 2 (two) times daily.      lamoTRIgine (LAMICTAL) 25 MG tablet Take 25 mg by mouth. Increase doage to 50 mg 4/5/2023      LEVEMIR FLEXTOUCH U-100 INSULN 100  "unit/mL (3 mL) InPn pen Inject into the skin.      levocetirizine (XYZAL) 5 MG tablet Take 5 mg by mouth.      lubiprostone (AMITIZA) 24 MCG Cap Take 24 mcg by mouth 2 (two) times daily.      lurasidone (LATUDA) 40 mg Tab tablet Take by mouth.      LYBALVI 10-10 mg Tab Take 1 tablet by mouth every evening.      magnesium oxide (MAG-OX) 400 mg (241.3 mg magnesium) tablet Take 400 mg by mouth 2 (two) times daily.      medroxyPROGESTERone (PROVERA) 10 MG tablet Take by mouth 3 (three) times daily with meals.      meloxicam (MOBIC) 15 MG tablet Take 1 tablet (15 mg total) by mouth once daily. 30 tablet 1    metFORMIN (GLUCOPHAGE-XR) 500 MG ER 24hr tablet SMARTSI Tablet(s) By Mouth      metoprolol succinate (TOPROL-XL) 50 MG 24 hr tablet Take 50 mg by mouth 2 (two) times daily.      mirabegron (MYRBETRIQ) 50 mg Tb24 Take 1 tablet (50 mg total) by mouth once daily. 30 tablet 11    montelukast (SINGULAIR) 10 mg tablet Take 10 mg by mouth every morning.      MOUNJARO 5 mg/0.5 mL PnIj SMARTSI Milligram(s) IM Once a Week      mupirocin (BACTROBAN) 2 % ointment 3 (three) times daily.      NOVOLOG FLEXPEN U-100 INSULIN 100 unit/mL (3 mL) InPn pen Inject into the skin.      ondansetron (ZOFRAN-ODT) 4 MG TbDL Take 4 mg by mouth every 6 (six) hours as needed.      ONETOUCH VERIO REFLECT METER [facility] use as directed      ONETOUCH VERIO TEST STRIPS Strp 5 (five) times daily.      oxyCODONE-acetaminophen (PERCOCET) 5-325 mg per tablet Take 1 tablet by mouth every 4 (four) hours as needed for Pain. 28 tablet 0    OZEMPIC 1 mg/dose (4 mg/3 mL) SMARTSI Milligram(s) Topical Once a Week      pantoprazole (PROTONIX) 40 MG tablet Take 40 mg by mouth.      PEN NEEDLE 31 gauge x 5/16" Ndle SMARTSI Pen Needle SUB-Q 5 Times Daily      potassium chloride (KLOR-CON) 8 MEQ TbSR Take 8 mEq by mouth 2 (two) times daily.      prednisoLONE acetate (PRED FORTE) 1 % DrpS Place 1 drop into the right eye 4 (four) times daily.      promethazine " (PHENERGAN) 25 MG tablet Take 25 mg by mouth every 6 (six) hours as needed.      ramelteon (ROZEREM) 8 mg tablet Take 8 mg by mouth every evening.      REXULTI 2 mg Tab Take 1 tablet by mouth.      rOPINIRole (REQUIP) 0.25 MG tablet Take 0.25 mg by mouth every evening.      spironolactone (ALDACTONE) 25 MG tablet Take 25 mg by mouth once daily.      TIADYLT  mg Cs24 Take by mouth.      tolterodine (DETROL LA) 4 MG 24 hr capsule Take 1 capsule (4 mg total) by mouth once daily. 30 capsule 11    TRESIBA FLEXTOUCH U-200 200 unit/mL (3 mL) insulin pen Inject 100 Units into the skin once daily.      triamcinolone acetonide 0.1% (KENALOG) 0.1 % Lotn SMARTSI Drop(s) In Ear(s) Every Night      TRULANCE 3 mg Tab Take 1 tablet by mouth once daily.      valACYclovir (VALTREX) 500 MG tablet Take 500 mg by mouth.      cefdinir (OMNICEF) 300 MG capsule Take 300 mg by mouth 2 (two) times daily. (Patient not taking: Reported on 2024)      FIASP FLEXTOUCH U-100 INSULIN 100 unit/mL (3 mL) InPn Inject 60 Units into the skin 3 (three) times daily with meals. (Patient not taking: Reported on 2024)      fluconazole (DIFLUCAN) 100 MG tablet Take 100 mg by mouth. (Patient not taking: Reported on 2024)      isosorbide mononitrate (ISMO,MONOKET) 10 mg tablet Take 10 mg by mouth once daily. (Patient not taking: Reported on 2024)      meloxicam (MOBIC) 15 MG tablet Take 1 tablet (15 mg total) by mouth once daily. (Patient not taking: Reported on 2024) 30 tablet 0    [] nitrofurantoin, macrocrystal-monohydrate, (MACROBID) 100 MG capsule Take 1 capsule (100 mg total) by mouth 2 (two) times daily. for 7 days 14 capsule 0    ondansetron (ZOFRAN) 4 MG tablet Take 1 tablet (4 mg total) by mouth 2 (two) times daily. (Patient not taking: Reported on 2024) 10 tablet 0    ondansetron (ZOFRAN) 8 MG tablet 8 mg. (Patient not taking: Reported on 2024)      OZEMPIC 0.25 mg or 0.5 mg(2 mg/1.5 mL) pen injector  Inject into the skin. (Patient not taking: Reported on 5/16/2024)       Facility-Administered Encounter Medications as of 5/16/2024   Medication Dose Route Frequency Provider Last Rate Last Admin    droPERidol injection 0.25 mg  0.25 mg Intravenous Once PRN Maeve Holden MD        lactated ringers infusion   Intravenous Continuous Maeve Holden MD 10 mL/hr at 04/11/23 1002 New Bag at 04/11/23 1002    LIDOcaine (PF) 10 mg/ml (1%) injection 10 mg  1 mL Intradermal Once Maeve Holden MD        metoclopramide HCl injection 10 mg  10 mg Intravenous Once PRN Maeve Holden MD        oxyCODONE immediate release tablet 5 mg  5 mg Oral Q3H PRN Maeve Holden MD           Physical Exam:  There were no vitals filed for this visit.    Physical Exam   General: NAD, voice normal  Neuro: AAO, CN II - XII grossly intact  Head/ Face: NCAT, symmetric, sensations intact bilaterally. + TMJ TTP  Eyes: EOMI, PERRL  Ears: externally normal with grossly normal hearing  AD: EAC patent, TM intact, no middle ear effusion, no retractions  AS: EAC patent, TM intact, no middle ear effusion, no retractions  Nose: bilateral nares patent, midline septum, no rhinorrhea, no external deformity, +ITH. Dry mucosa  OC/OP: MMM, no intraoral lesions, no trismus, dentition is moderate, no uvular deviation, bilaterally symmetric soft palate elevation, palatoglossus and palatopharyngeal fold wnl; tonsils are symmetric/absent  Indirect laryngoscopy: deferred due to patient intolerance  Neck: soft, supple, no LAD, normal ROM, no thyromegaly  Respiratory: nonlabored, no wheezing, bilateral chest rise  Cardiovascular: RRR  Gastrointestinal: S NT ND  Skin: warm, no lesions  Musculoskeletal: 5/5 strength  Psych: Appropriate affect/mood     Pertinent Data:  ? LABS:  ? AUDIO:           ? PATH:      Imaging:   I personally reviewed the following images:        Assessment/Plan:  34 y.o. female with otalgia likely 2/2 TMJ arthraliga/myofascial pain, nasal  congestion, and nasal dryness. CT head 6/2022 without any sinus or ME dz. Instructed pt to bring disk of MRI if she would like to review at next visit.  - NSI BID  - Astelin daily (reviewed correct technique)  - Saline gel prn, at least BID  - TMJ & neck exercises  - Soft diet x 2-3 weeks  - Warm/cool compresses  -   - RTC 2-3mo    Trista Hargrove NP

## 2024-07-29 DIAGNOSIS — M54.50 LOW BACK PAIN: Primary | ICD-10-CM

## 2024-07-31 ENCOUNTER — CLINICAL SUPPORT (OUTPATIENT)
Dept: ORTHOPEDICS | Facility: CLINIC | Age: 34
End: 2024-07-31
Payer: MEDICAID

## 2024-07-31 ENCOUNTER — HOSPITAL ENCOUNTER (OUTPATIENT)
Dept: RADIOLOGY | Facility: HOSPITAL | Age: 34
Discharge: HOME | End: 2024-07-31
Attending: ORTHOPAEDIC SURGERY
Payer: MEDICAID

## 2024-07-31 VITALS
BODY MASS INDEX: 49.31 KG/M2 | DIASTOLIC BLOOD PRESSURE: 84 MMHG | HEART RATE: 112 BPM | HEIGHT: 64 IN | WEIGHT: 288.81 LBS | SYSTOLIC BLOOD PRESSURE: 124 MMHG

## 2024-07-31 DIAGNOSIS — M25.562 CHRONIC PAIN OF LEFT KNEE: Primary | ICD-10-CM

## 2024-07-31 DIAGNOSIS — G89.29 CHRONIC PAIN OF LEFT KNEE: ICD-10-CM

## 2024-07-31 DIAGNOSIS — M25.562 CHRONIC PAIN OF LEFT KNEE: ICD-10-CM

## 2024-07-31 DIAGNOSIS — G89.29 CHRONIC PAIN OF LEFT KNEE: Primary | ICD-10-CM

## 2024-07-31 PROCEDURE — 73564 X-RAY EXAM KNEE 4 OR MORE: CPT | Mod: TC,LT

## 2024-07-31 PROCEDURE — 99215 OFFICE O/P EST HI 40 MIN: CPT | Mod: PBBFAC,25

## 2024-07-31 RX ORDER — RANOLAZINE 500 MG/1
500 TABLET, EXTENDED RELEASE ORAL 2 TIMES DAILY
COMMUNITY
Start: 2024-05-22

## 2024-07-31 RX ORDER — METOPROLOL SUCCINATE 25 MG/1
TABLET, EXTENDED RELEASE ORAL
COMMUNITY
Start: 2024-07-31

## 2024-07-31 RX ORDER — TERBINAFINE HYDROCHLORIDE 250 MG/1
250 TABLET ORAL
COMMUNITY
Start: 2024-05-16

## 2024-07-31 RX ORDER — ONDANSETRON 8 MG/1
8 TABLET, ORALLY DISINTEGRATING ORAL EVERY 6 HOURS PRN
COMMUNITY
Start: 2024-05-15

## 2024-07-31 RX ORDER — ZOLPIDEM TARTRATE 5 MG/1
1 TABLET ORAL NIGHTLY
COMMUNITY
Start: 2024-07-08

## 2024-07-31 RX ORDER — VONOPRAZAN FUMARATE 26.72 MG/1
1 TABLET ORAL
COMMUNITY
Start: 2024-07-25

## 2024-07-31 RX ORDER — SUVOREXANT 10 MG/1
1 TABLET, FILM COATED ORAL NIGHTLY PRN
COMMUNITY
Start: 2024-05-14

## 2024-07-31 RX ORDER — TIZANIDINE 4 MG/1
4 TABLET ORAL 3 TIMES DAILY
COMMUNITY
Start: 2024-07-29

## 2024-10-02 ENCOUNTER — OFFICE VISIT (OUTPATIENT)
Dept: ORTHOPEDICS | Facility: CLINIC | Age: 34
End: 2024-10-02
Payer: MEDICAID

## 2024-10-02 VITALS — DIASTOLIC BLOOD PRESSURE: 88 MMHG | HEART RATE: 110 BPM | OXYGEN SATURATION: 99 % | SYSTOLIC BLOOD PRESSURE: 127 MMHG

## 2024-10-02 DIAGNOSIS — M25.562 CHRONIC PAIN OF LEFT KNEE: Primary | ICD-10-CM

## 2024-10-02 DIAGNOSIS — G89.29 CHRONIC PAIN OF LEFT KNEE: Primary | ICD-10-CM

## 2024-10-02 PROCEDURE — 3079F DIAST BP 80-89 MM HG: CPT | Mod: CPTII,,, | Performed by: REHABILITATION UNIT

## 2024-10-02 PROCEDURE — 99215 OFFICE O/P EST HI 40 MIN: CPT | Mod: PBBFAC

## 2024-10-02 PROCEDURE — 3074F SYST BP LT 130 MM HG: CPT | Mod: CPTII,,, | Performed by: REHABILITATION UNIT

## 2024-10-02 PROCEDURE — 1159F MED LIST DOCD IN RCRD: CPT | Mod: CPTII,,, | Performed by: REHABILITATION UNIT

## 2024-10-02 PROCEDURE — 99024 POSTOP FOLLOW-UP VISIT: CPT | Mod: ,,, | Performed by: REHABILITATION UNIT

## 2024-10-02 NOTE — PROGRESS NOTES
\[facility]\"" Orthopaedic Surgery Clinic Note    In brief, Katelyn Lopez is a 34 y.o. female BMI 49.5, with diabetes presents to clinic for initial evaluation of left knee pain.  She did undergo a right knee arthroscopy with lateral meniscectomy on 2023.  She presents today due to pain in her left knee that she states it is similar to how the right knee was before surgery.  Denies trauma prior to the onset.  Has pain in the front of the knee globally.  Also has numbness tingling traveling down both legs.    Interval history   Patient presents today for routine follow-up of left knee pain.  Patient has undergone a 6 week course of physical therapy.  Says that symptoms intermittently improved but have since returned.  She reports catching, locking and popping as well as predominantly medial sided knee soreness.  Medications have not helped.  She does report feeling unstable on her knee but thinks that this is due to the pain.  Denies any interval traumatic events.      PMH:   Past Medical History:   Diagnosis Date    Asthma     CHF (congestive heart failure) 3 or 4 years ago    Congenital heart disease     Diabetes mellitus type I     Food allergy 2016    GERD (gastroesophageal reflux disease)     Hypertension     Mental disorder 2006    Anxiety    Migraine headache 3 yesrs    Obesity Kid    Respiratory distress     Seasonal allergies 2018    Peanuts    Sleep apnea 2018    Stroke        PSH:   Past Surgical History:   Procedure Laterality Date     SECTION      CHOLECYSTECTOMY  2016    GALLBLADDER SURGERY      GERD surgery      KNEE ARTHROSCOPY W/ MENISCECTOMY Right 2023    Procedure: ARTHROSCOPY, KNEE, WITH partial lateral meniscectomy;  Surgeon: Barrie Corona MD;  Location: [facility];  Service: Orthopedics;  Laterality: Right;  supine, leg younger, tourniquet    KNEE SURGERY      Right knee surgery    TONSILLECTOMY         SH:   Social History     Socioeconomic History    Marital status:   "  Tobacco Use    Smoking status: Never    Smokeless tobacco: Never   Substance and Sexual Activity    Alcohol use: Never    Drug use: Never    Sexual activity: Never   Social History Narrative    ** Merged History Encounter **            FH:   Family History   Problem Relation Name Age of Onset    Arthritis Mother Madie Kim     Asthma Mother Madie Kim     Diabetes Mother Madie Kim     Hypertension Mother Madie Kim     Heart disease Maternal Grandmother Vida Lopez     Stroke Maternal Grandmother Vida Lopez     Cancer Paternal Grandfather Augie Dia     Cancer Paternal Grandmother Vida Dia     Vision loss Paternal Grandmother Vida Dia     Arthritis Brother Sam Lopez     Asthma Brother Sam Lopez     Birth defects Brother Sam Lopez     Depression Brother Sam Lopez     Diabetes Brother Sam Lopez     Hypertension Brother Sam Lopez     Learning disabilities Brother Sam Lopez     Mental illness Brother Sam Lopez     Intellectual disability Brother Sam Lopez     Arthritis Maternal Aunt Kirsten Lopez, Solitario Lopez         Mainly whole family    Asthma Son Howard Miller Jr        Allergies:   Review of patient's allergies indicates:   Allergen Reactions    Azithromycin     Bupropion     Choline,magnesium salicylate     Ciprofloxacin     Dulaglutide     Hydrochlorothiazide     Hydromorphone Itching and Other (See Comments)     Other reaction(s): Injection site burning    has had demerol previously with itching, alleviated with benadryl.    Insulin glargine Other (See Comments)    Liraglutide     Morphine     Peanut     Penicillins Other (See Comments)     Other reaction(s): vaginal burning    Sitagliptin     Sitagliptin phosphate Other (See Comments)    Sulfamethoxazole     Ziprasidone      Other reaction(s): "withdraw"    Cefixime Nausea Only       ROS:  Constitutional- no fever, fatigue, weakness  Eye- no vision loss, eye redness, drainage, or pain  ENMT- no sore " throat, ear pain, sinus pain/congestion, nasal congestion/drainage  Respiratory- no cough, wheezing, or shortness of breath  CV- no chest pain, no palpitations, no edema  GI- no N/V/D; no abdominal pain    Physical Exam:  Vitals:    10/02/24 1229   BP: 127/88   Pulse: 110       General: NAD  Cardio: RRR by peripheral pulse  Pulm: Normal WOB on room air, symmetric chest rise  Abd: Soft, NT/ND    MSK:  Left knee:   No open wounds or superficial abrasions.  Tender to palpation at the inferior pole of the patella, patellar tendon, tibial tubercle.  Knee range of motion from 0-100 degrees of flexion.  Knee stable to varus valgus stress at 0 and 30° of flexion.  Negative anterior-posterior drawer.  Lachman's 1A.  Negative Lupe's.  Neurovascular intact distally    Imaging:   X-ray left knee: Mild medial joint space narrowing    Assessment:  34-year-old female BMI 49.57 with poorly-controlled diabetes who has lumbar radiculopathy and atraumatic left knee pain with mechanical symptoms     -we will obtain MRI of the left knee to evaluate for internal derangement given her mechanical symptoms.  Return to clinic after MRI for review of results.    Terry Jaramillo MD  [facility]  10/2/2024 3:04 PM

## 2024-10-02 NOTE — PROGRESS NOTES
Faculty Attestation: Katelyn Lopez  was seen at [facility] in the Orthopaedic Clinic. Discussed with the resident at the time of the visit. History of Present Illness, Physical Exam, and Assessment and Plan reviewed. Treatment plan is reasonable and appropriate. Compliance with treatment recommendations is important. No procedure was performed.     Benjamin Darling MD  Orthopaedic Surgery

## 2024-11-07 ENCOUNTER — HOSPITAL ENCOUNTER (OUTPATIENT)
Dept: RADIOLOGY | Facility: HOSPITAL | Age: 34
Discharge: HOME | End: 2024-11-07
Attending: STUDENT IN AN ORGANIZED HEALTH CARE EDUCATION/TRAINING PROGRAM
Payer: MEDICAID

## 2024-11-07 DIAGNOSIS — M25.562 CHRONIC PAIN OF LEFT KNEE: ICD-10-CM

## 2024-11-07 DIAGNOSIS — G89.29 CHRONIC PAIN OF LEFT KNEE: ICD-10-CM

## 2024-11-07 PROCEDURE — 73721 MRI JNT OF LWR EXTRE W/O DYE: CPT | Mod: TC,LT

## 2024-11-11 ENCOUNTER — TELEPHONE (OUTPATIENT)
Dept: ORTHOPEDICS | Facility: CLINIC | Age: 34
End: 2024-11-11
Payer: MEDICAID

## 2024-11-11 NOTE — TELEPHONE ENCOUNTER
----- Message from Marybel sent at 11/11/2024  2:28 PM CST -----  Regarding: MRI complete.  Pt called to notify nurse about having her MRI completed.

## 2024-11-14 ENCOUNTER — OFFICE VISIT (OUTPATIENT)
Dept: UROLOGY | Facility: CLINIC | Age: 34
End: 2024-11-14
Payer: MEDICAID

## 2024-11-14 VITALS
DIASTOLIC BLOOD PRESSURE: 88 MMHG | HEIGHT: 64 IN | WEIGHT: 293 LBS | SYSTOLIC BLOOD PRESSURE: 127 MMHG | OXYGEN SATURATION: 98 % | RESPIRATION RATE: 20 BRPM | BODY MASS INDEX: 50.02 KG/M2 | TEMPERATURE: 98 F | HEART RATE: 98 BPM

## 2024-11-14 DIAGNOSIS — R30.0 DYSURIA: ICD-10-CM

## 2024-11-14 DIAGNOSIS — N39.41 URGE INCONTINENCE: Primary | ICD-10-CM

## 2024-11-14 LAB
BILIRUB SERPL-MCNC: NORMAL MG/DL
BLOOD URINE, POC: NORMAL
COLOR, POC UA: YELLOW
GLUCOSE UR QL STRIP: 100
KETONES UR QL STRIP: NORMAL
LEUKOCYTE ESTERASE URINE, POC: NORMAL
NITRITE, POC UA: NORMAL
PH, POC UA: 6
PROTEIN, POC: NORMAL
SPECIFIC GRAVITY, POC UA: 1.01
UROBILINOGEN, POC UA: 0.2

## 2024-11-14 PROCEDURE — 1160F RVW MEDS BY RX/DR IN RCRD: CPT | Mod: CPTII,,, | Performed by: UROLOGY

## 2024-11-14 PROCEDURE — 3079F DIAST BP 80-89 MM HG: CPT | Mod: CPTII,,, | Performed by: UROLOGY

## 2024-11-14 PROCEDURE — 3074F SYST BP LT 130 MM HG: CPT | Mod: CPTII,,, | Performed by: UROLOGY

## 2024-11-14 PROCEDURE — 3008F BODY MASS INDEX DOCD: CPT | Mod: CPTII,,, | Performed by: UROLOGY

## 2024-11-14 PROCEDURE — 81001 URINALYSIS AUTO W/SCOPE: CPT | Mod: PBBFAC | Performed by: UROLOGY

## 2024-11-14 PROCEDURE — 99215 OFFICE O/P EST HI 40 MIN: CPT | Mod: PBBFAC | Performed by: UROLOGY

## 2024-11-14 PROCEDURE — 87186 SC STD MICRODIL/AGAR DIL: CPT | Performed by: UROLOGY

## 2024-11-14 PROCEDURE — 1159F MED LIST DOCD IN RCRD: CPT | Mod: CPTII,,, | Performed by: UROLOGY

## 2024-11-14 PROCEDURE — 99214 OFFICE O/P EST MOD 30 MIN: CPT | Mod: S$PBB,,, | Performed by: UROLOGY

## 2024-11-14 RX ORDER — CEFDINIR 300 MG/1
300 CAPSULE ORAL 2 TIMES DAILY
Qty: 14 CAPSULE | Refills: 0 | Status: SHIPPED | OUTPATIENT
Start: 2024-11-14 | End: 2024-11-21

## 2024-11-14 NOTE — PROGRESS NOTES
CC:    Possible urinary tract infection    HPI:  Katelyn Lopez is a 34 y.o. female here for possible urinary tract infection.  She began about a month ago with dysuria and feeling that the bladder is not emptying. She has to push to get the remainder of the urine out.  She was treated with Macrobid and just finished that given to her by her primary care but that did not make a difference in the symptoms.    She has urgency and urge incontinence.  She is on Myrbetriq and tolterodine which is helping.  She was found to have incomplete bladder emptying and was placed on bethanechol but she is stopped taking it and has been emptying the bladder well. She does have diabetes and also has gastroparesis.    Urinalysis:    Results for orders placed or performed in visit on 11/14/24   POCT URINE DIPSTICK WITH MICROSCOPE, AUTOMATED   Result Value Ref Range    Color, UA Yellow     Spec Grav UA 1.015     pH, UA 6.0     WBC, UA small     Nitrite, UA neg     Protein, POC neg     Glucose,      Ketones, UA neg     Urobilinogen, UA 0.2     Bilirubin, POC neg     Blood, UA neg      Microscopic Urinalysis:  WBC:   1-2 per HPF     RBC:    None per HPF     Bacteria:    Rare per HPF     Squamous epithelial cells:  None per HPF      Crystals:   None    ROS:  All systems reviewed and are negative except as documented in HPI and/or Assessment and Plan.     Patient Active Problem List:     Patient Active Problem List   Diagnosis    Right knee pain    Difficulty walking    Internal derangement of right knee    BMI 50.0-59.9, adult    Tear of lateral meniscus of right knee, current        Past Medical History:  Past Medical History:   Diagnosis Date    Asthma     CHF (congestive heart failure) 3 or 4 years ago    Congenital heart disease     Diabetes mellitus type I     Food allergy 2016    GERD (gastroesophageal reflux disease)     Hypertension     Mental disorder 2006    Anxiety    Migraine headache 3 yesrs    Obesity Kid     Respiratory distress     Seasonal allergies 2018    Peanuts    Sleep apnea 2018    Stroke         Past Surgical History:  Past Surgical History:   Procedure Laterality Date     SECTION      CHOLECYSTECTOMY  2016    GALLBLADDER SURGERY      GERD surgery      KNEE ARTHROSCOPY W/ MENISCECTOMY Right 2023    Procedure: ARTHROSCOPY, KNEE, WITH partial lateral meniscectomy;  Surgeon: Barrie Corona MD;  Location: [facility];  Service: Orthopedics;  Laterality: Right;  supine, leg younger, tourniquet    KNEE SURGERY      Right knee surgery    TONSILLECTOMY          Family History:  Family History   Problem Relation Name Age of Onset    Arthritis Mother Madie Kim     Asthma Mother Madie Kim     Diabetes Mother Madie Kim     Hypertension Mother Madie Kim     Heart disease Maternal Grandmother Vida Lopez     Stroke Maternal Grandmother Vida Lopez     Cancer Paternal Grandfather Augie Dia     Cancer Paternal Grandmother Vida Dia     Vision loss Paternal Grandmother Vida Dia     Arthritis Brother Sam Lopez     Asthma Brother Sam Lopez     Birth defects Brother Sam Lopez     Depression Brother Sam Lopez     Diabetes Brother Sam Lopez     Hypertension Brother Sam Lopez     Learning disabilities Brother Sam Lopez     Mental illness Brother Sam Lopez     Intellectual disability Brother Sam Lopez     Arthritis Maternal Aunt Kirsten John, Solitario Lopez         Mainly whole family    Asthma Son Howard Miller Jr         Social History:  Social History     Socioeconomic History    Marital status:    Tobacco Use    Smoking status: Never    Smokeless tobacco: Never   Substance and Sexual Activity    Alcohol use: Never    Drug use: Never    Sexual activity: Never   Social History Narrative    ** Merged History Encounter **             Allergies:  Review of patient's allergies indicates:   Allergen Reactions    Azithromycin     Bupropion     Choline,magnesium  "salicylate     Ciprofloxacin     Dulaglutide     Hydrochlorothiazide     Hydromorphone Itching and Other (See Comments)     Other reaction(s): Injection site burning    has had demerol previously with itching, alleviated with benadryl.    Insulin glargine Other (See Comments)    Liraglutide     Morphine     Peanut     Penicillins Other (See Comments)     Other reaction(s): vaginal burning    Sitagliptin     Sitagliptin phosphate Other (See Comments)    Sulfamethoxazole     Ziprasidone      Other reaction(s): "withdraw"    Cefixime Nausea Only        Objective:  Vitals:    11/14/24 1529   BP: 127/88   Pulse: 98   Resp: 20   Temp: 98.4 °F (36.9 °C)     General:  Well developed, well nourished adult female in no acute distress  Abdomen: Soft, nontender, no masses  Extremities:  No clubbing, cyanosis, or edema  Neurologic:  Grossly intact  Musculoskeletal:  Normal tone    Assessment:  1. Dysuria  - POCT URINE DIPSTICK WITH MICROSCOPE, AUTOMATED  - Urine culture  - cefdinir (OMNICEF) 300 MG capsule; Take 1 capsule (300 mg total) by mouth 2 (two) times daily. for 7 days  Dispense: 14 capsule; Refill: 0    2. Urge incontinence     Plan:  Urine culture was sent.  She was given cefdinir.  We will call and check on her to see if the symptoms have resolved especially if the urine culture is negative.  If she continues to have symptoms we will plan to do bladder instillations.  I discussed the instillations with her and explained interstitial cystitis as well as the process for the instillations with her.  We will schedule her for yearly follow-up but she can return if she continues to have problems  Continue Myrbetriq and tolterodine.    Follow-up tests needed:   None     Return appointment:  One year.          "

## 2024-11-14 NOTE — PROGRESS NOTES
Pt seen by Dr. Cortez; Urine collected & sent to lab; Pt instructed on bladder instillations & stated she will call if she would like to start them; Pt instructed to return to clinic in 1 year; Discharge paperwork given w/pt verbalizing understanding

## 2024-11-16 LAB — BACTERIA UR CULT: ABNORMAL

## 2024-11-18 ENCOUNTER — TELEPHONE (OUTPATIENT)
Dept: UROLOGY | Facility: CLINIC | Age: 34
End: 2024-11-18
Payer: MEDICAID

## 2024-11-18 NOTE — TELEPHONE ENCOUNTER
The urine culture was positive for E coli.  It was sensitive to the medication I gave her which was cefdinir.  I did tell her that we will call and see if she was feeling better.  If her symptoms have not improved then we were going to begin instillations which I explained to her in detail.  If she is not doing better and wants to proceed with instillations please  for those.

## 2024-11-19 NOTE — TELEPHONE ENCOUNTER
Called & explained results w/pt stating understanding. Pt stating her symptoms are much better since starting the antibiotics & that she would like to hold off on starting bladder instillations for now. She will call if she decides to proceed with instillations

## 2025-01-28 DIAGNOSIS — R13.14 DYSPHAGIA, PHARYNGOESOPHAGEAL PHASE: Primary | ICD-10-CM

## 2025-01-28 DIAGNOSIS — K21.9 GASTROESOPHAGEAL REFLUX DISEASE: ICD-10-CM

## 2025-01-28 DIAGNOSIS — R10.84 ABDOMINAL PAIN, GENERALIZED: ICD-10-CM

## 2025-01-28 DIAGNOSIS — R11.2 NAUSEA WITH VOMITING: ICD-10-CM

## 2025-02-05 ENCOUNTER — OFFICE VISIT (OUTPATIENT)
Dept: ORTHOPEDICS | Facility: CLINIC | Age: 35
End: 2025-02-05
Payer: MEDICAID

## 2025-02-05 VITALS
SYSTOLIC BLOOD PRESSURE: 109 MMHG | HEIGHT: 64 IN | DIASTOLIC BLOOD PRESSURE: 72 MMHG | BODY MASS INDEX: 51.12 KG/M2 | HEART RATE: 100 BPM

## 2025-02-05 DIAGNOSIS — S83.282D ACUTE TEAR LATERAL MENISCUS, LEFT, SUBSEQUENT ENCOUNTER: Primary | ICD-10-CM

## 2025-02-05 PROCEDURE — 1159F MED LIST DOCD IN RCRD: CPT | Mod: CPTII,,, | Performed by: ORTHOPAEDIC SURGERY

## 2025-02-05 PROCEDURE — 99214 OFFICE O/P EST MOD 30 MIN: CPT | Mod: PBBFAC

## 2025-02-05 PROCEDURE — 3074F SYST BP LT 130 MM HG: CPT | Mod: CPTII,,, | Performed by: ORTHOPAEDIC SURGERY

## 2025-02-05 PROCEDURE — 3078F DIAST BP <80 MM HG: CPT | Mod: CPTII,,, | Performed by: ORTHOPAEDIC SURGERY

## 2025-02-05 PROCEDURE — 99214 OFFICE O/P EST MOD 30 MIN: CPT | Mod: S$PBB,,, | Performed by: ORTHOPAEDIC SURGERY

## 2025-02-05 PROCEDURE — 3008F BODY MASS INDEX DOCD: CPT | Mod: CPTII,,, | Performed by: ORTHOPAEDIC SURGERY

## 2025-02-05 NOTE — PROGRESS NOTES
Faculty Attestation: Katelyn Lopez  was seen at [facility] in the Orthopaedic Clinic in the outpatient department at a [facility]. Patient seen and evaluated at the time of the visit.  I participated in the management of the patient and was immediately available throughout the encounter. History of Present Illness, Physical Exam, and Assessment and Plan reviewed. Treatment plan is reasonable and appropriate. Compliance with treatment recommendations is important. No procedure was performed.     Anshul Jarrett MD  Orthopedic Surgery Chief

## 2025-02-05 NOTE — PROGRESS NOTES
[facility] Orthopaedic Surgery Clinic Note    In brief, Katelyn Lopez is a 34 y.o. female BMI 49.5, with diabetes presents to clinic for initial evaluation of left knee pain.  She did undergo a right knee arthroscopy with lateral meniscectomy on 2023.  She presents today due to pain in her left knee that she states it is similar to how the right knee was before surgery.  Denies trauma prior to the onset.  Has pain in the front of the knee globally.  Also has numbness tingling traveling down both legs.    Interval history   Patient presents today for routine follow-up of left knee pain.  She is here to review her MRI findings.  She continues to have pain in the left knee.  She does endorse some catching and locking.  She would like to proceed with surgery.      PMH:   Past Medical History:   Diagnosis Date    Asthma     CHF (congestive heart failure) 3 or 4 years ago    Congenital heart disease     Diabetes mellitus type I     Food allergy 2016    GERD (gastroesophageal reflux disease)     Hypertension     Mental disorder 2006    Anxiety    Migraine headache 3 yesrs    Obesity Kid    Respiratory distress     Seasonal allergies 2018    Peanuts    Sleep apnea 2018    Stroke        PSH:   Past Surgical History:   Procedure Laterality Date     SECTION      CHOLECYSTECTOMY  2016    GALLBLADDER SURGERY      GERD surgery      KNEE ARTHROSCOPY W/ MENISCECTOMY Right 2023    Procedure: ARTHROSCOPY, KNEE, WITH partial lateral meniscectomy;  Surgeon: Barrie Corona MD;  Location: [facility];  Service: Orthopedics;  Laterality: Right;  supine, leg younger, tourniquet    KNEE SURGERY      Right knee surgery    TONSILLECTOMY         SH:   Social History     Socioeconomic History    Marital status:    Tobacco Use    Smoking status: Never    Smokeless tobacco: Never   Substance and Sexual Activity    Alcohol use: Never    Drug use: Never    Sexual activity: Never   Social History Narrative    ** Merged History  "Encounter **            FH:   Family History   Problem Relation Name Age of Onset    Arthritis Mother Madie Kim     Asthma Mother Madie Kim     Diabetes Mother Madie Kim     Hypertension Mother Madie Kim     Heart disease Maternal Grandmother Vida Lopez     Stroke Maternal Grandmother Vida Lopez     Cancer Paternal Grandfather Augie Dia     Cancer Paternal Grandmother Vida Dia     Vision loss Paternal Grandmother Vida Dia     Arthritis Brother Sam Lopez     Asthma Brother Sam Lopez     Birth defects Brother Sam Lopez     Depression Brother Sam Lopez     Diabetes Brother Sam Lopez     Hypertension Brother Sam Lopez     Learning disabilities Brother Sam Lopez     Mental illness Brother Sam Lopez     Intellectual disability Brother Sam Lopez     Arthritis Maternal Aunt Kirsten Lopez, Solitario Lopez         Mainly whole family    Asthma Son Howard Miller Jr        Allergies:   Review of patient's allergies indicates:   Allergen Reactions    Azithromycin     Bupropion     Choline,magnesium salicylate     Ciprofloxacin     Dulaglutide     Hydrochlorothiazide     Hydromorphone Itching and Other (See Comments)     Other reaction(s): Injection site burning    has had demerol previously with itching, alleviated with benadryl.    Insulin glargine Other (See Comments)    Liraglutide     Morphine     Peanut     Penicillins Other (See Comments)     Other reaction(s): vaginal burning    Sitagliptin     Sitagliptin phosphate Other (See Comments)    Sulfamethoxazole     Ziprasidone      Other reaction(s): "withdraw"    Cefixime Nausea Only       ROS:  Constitutional- no fever, fatigue, weakness  Eye- no vision loss, eye redness, drainage, or pain  ENMT- no sore throat, ear pain, sinus pain/congestion, nasal congestion/drainage  Respiratory- no cough, wheezing, or shortness of breath  CV- no chest pain, no palpitations, no edema  GI- no N/V/D; no abdominal pain    Physical " Exam:  Vitals:    02/05/25 0956   BP: 109/72   Pulse: 100       General: NAD  Cardio: RRR by peripheral pulse  Pulm: Normal WOB on room air, symmetric chest rise  Abd: Soft, NT/ND    MSK:  Left knee:   No open wounds or superficial abrasions.  Tender to palpation at the inferior pole of the patella, patellar tendon, tibial tubercle.  Knee range of motion from 0-100 degrees of flexion.  Knee stable to varus valgus stress at 0 and 30° of flexion.  Negative anterior-posterior drawer.  Lachman's 1A. Positive Lupe's.  Neurovascular intact distally    Imaging:   X-ray left knee: Mild medial joint space narrowing    MRI 11/7/24  Impression:     Moderate-sized meniscal tear of the anterior horn of the lateral meniscus extending along the superior articular surface.  Associated adjacent anterior capsular and Hoffa's fat pad edema and complex parameniscal cyst formation.    Assessment:  34-year-old female BMI 49.57 with poorly-controlled diabetes who has lumbar radiculopathy and atraumatic left knee pain with mechanical symptoms with anterior lateral meniscus tear and associated cyst on MRI    Risks, benefits, alternatives discussed with the patient.  Risks including but not limited to infection, bleeding, damage to surrounding structures, need for further surgery, continued pain, continued symptoms were discussed.  Patient would like to proceed with left knee arthroscopy with meniscal debridement versus repair.  We will schedule her for 03/11/2025 with Dr. Luiza Morales MD  [facility] Orthopaedic Surgery  2/5/2025 3:04 PM

## 2025-02-05 NOTE — LETTER
[facility] Risk Assessment Request Form  Patients Name: Katelyn CORTEZ John FAY.B.: 1990  Today's Date: 2025     Dr. Singh        The above named patient is scheduled to have a Left knee scope with meniscus debridement on 2025.    Surgeon: Anshul Jarrett  Type of Anesthesia: General  Requesting Staff Name: Divine Soni MA    This patient needs surgical clearance from your office for this procedure.    Please perform necessary tests in order for this patient to be medically cleared for surgery.     PLEASE FAX THE CLEARANCE LETTER WITH THE MOST RECENT DIAGNOSTICS AND PROGRESS NOTES TO US AT [phone].  PLEASE BE SURE TO INCLUDE ANY MEDICATION INSTRUCTIONS THAT SHOULD BE HELD PRIOR TO SURGERY.     CHECK ALL THAT APPLY AND COMPLETE THE BLANKS:   x Request for cardiac risk assessment for procedure stated above.   (Please fax us the risk assessment/clearance letter with the most recent ECHO, EKG or stress test.)   x Medication instructions prior to surgery.   (Please specify if you recommend the patient stop any medications prior to surgery and how many days prior to surgery.)   x Request for Cardiology clearance for the procedure stated above.   (Please fax us the clearance letter with the most recent diagnostics and progress notes.)    We appreciate your help in getting our patient cleared for surgery.    Please feel free to call our office should you have any questions.     Thank you,   Anshul Jarrett      Phone Number: [phone]  Fax Number: [phone]

## 2025-02-07 ENCOUNTER — HOSPITAL ENCOUNTER (OUTPATIENT)
Dept: RADIOLOGY | Facility: HOSPITAL | Age: 35
Discharge: HOME | End: 2025-02-07
Attending: NURSE PRACTITIONER
Payer: MEDICAID

## 2025-02-07 DIAGNOSIS — R11.2 NAUSEA WITH VOMITING: ICD-10-CM

## 2025-02-07 DIAGNOSIS — R13.14 DYSPHAGIA, PHARYNGOESOPHAGEAL PHASE: ICD-10-CM

## 2025-02-07 DIAGNOSIS — K21.9 GASTROESOPHAGEAL REFLUX DISEASE: ICD-10-CM

## 2025-02-07 DIAGNOSIS — R10.84 ABDOMINAL PAIN, GENERALIZED: ICD-10-CM

## 2025-02-07 PROCEDURE — 25500020 PHARM REV CODE 255: Performed by: NURSE PRACTITIONER

## 2025-02-07 PROCEDURE — A9698 NON-RAD CONTRAST MATERIALNOC: HCPCS | Performed by: NURSE PRACTITIONER

## 2025-02-07 PROCEDURE — 74220 X-RAY XM ESOPHAGUS 1CNTRST: CPT | Mod: TC

## 2025-02-07 RX ADMIN — BARIUM SULFATE 100 ML: 980 POWDER, FOR SUSPENSION ORAL at 09:02

## 2025-02-07 RX ADMIN — BARIUM SULFATE 100 ML: 0.6 SUSPENSION ORAL at 09:02

## 2025-03-11 ENCOUNTER — ANESTHESIA EVENT (OUTPATIENT)
Dept: SURGERY | Facility: HOSPITAL | Age: 35
End: 2025-03-11
Payer: MEDICAID

## 2025-03-11 NOTE — ANESTHESIA PREPROCEDURE EVALUATION
Katelyn Lopez is a 34 y.o. female presenting for ARTHROSCOPY,KNEE,WITH MENISCUS REPAIR VERSUS DEBRIDEMENT (Left) with a history of   -Acute tear lateral meniscus   -T2DM; AM CBG      -ER 2024 d/t hyperglycemia      -on insulin pump--Novolog  -URINARY INCONTINENCE/CHRONIC UTIS  -SLEEP APNEA  -HTN  -ANXIETY/DEPRESSION  -ASTHMA  -PULM HTN  -CHF  -H/O TIA  -GERD  -OBESITY, BMI       BETA-BLOCKER: METOPROLOL   Last dose:  MOUNJARO OR OZEMPIC (HOLD X 1 WK PRIOR): has not taken in > 1 year  ASA LD (hold 7 days prior per cards):     New Orders for Anesthesia: UPT, DM PROTOCOL     Problem List[1]    Past Surgical History:   Procedure Laterality Date     SECTION      CHOLECYSTECTOMY  2016    GALLBLADDER SURGERY      GERD surgery      KNEE ARTHROSCOPY W/ MENISCECTOMY Right 2023    Procedure: ARTHROSCOPY, KNEE, WITH partial lateral meniscectomy;  Surgeon: Barrie Corona MD;  Location: [facility];  Service: Orthopedics;  Laterality: Right;  supine, leg younger, tourniquet    KNEE SURGERY      Right knee surgery    TONSILLECTOMY         Lab Results   Component Value Date    WBC 8.57 2024    HGB 13.0 2024    HCT 39.4 2024     2024       CMP  Sodium   Date Value Ref Range Status   2024 136 136 - 145 mmol/L Final     Potassium   Date Value Ref Range Status   2024 4.0 3.5 - 5.1 mmol/L Final     Chloride   Date Value Ref Range Status   2024 103 98 - 107 mmol/L Final     CO2   Date Value Ref Range Status   2024 25 22 - 29 mmol/L Final     Blood Urea Nitrogen   Date Value Ref Range Status   2024 11.0 7.0 - 18.7 mg/dL Final     Creatinine   Date Value Ref Range Status   2024 1.13 (H) 0.55 - 1.02 mg/dL Final     Calcium   Date Value Ref Range Status   2024 9.6 8.4 - 10.2 mg/dL Final     Albumin   Date Value Ref Range Status   2024 3.6 3.5 - 5.0 g/dL Final     Bilirubin Total   Date Value Ref Range Status   2024 0.3 <=1.5 mg/dL Final      ALP   Date Value Ref Range Status   04/18/2024 140 40 - 150 unit/L Final     AST   Date Value Ref Range Status   04/18/2024 15 5 - 34 unit/L Final     ALT   Date Value Ref Range Status   04/18/2024 17 0 - 55 unit/L Final     eGFR   Date Value Ref Range Status   04/18/2024 >60 mls/min/1.73/m2 Final         CARDS OV 2/24/25            ECHO 2/20/25    Past anesthesia records:       Pre-op Assessment    I have reviewed the Patient Summary Reports.     I have reviewed the Nursing Notes. I have reviewed the NPO Status.   I have reviewed the Medications.     Review of Systems  Anesthesia Hx:  No problems with previous Anesthesia             Denies Family Hx of Anesthesia complications.    Denies Personal Hx of Anesthesia complications.                    Social:  Non-Smoker, No Alcohol Use       Hematology/Oncology:  Hematology Normal   Oncology Normal                                   EENT/Dental:  EENT/Dental Normal           Cardiovascular:     Hypertension       CHF                                   Pulmonary:        Sleep Apnea Covid positive asymptomatic               Renal/:  Renal/ Normal                 Hepatic/GI:     GERD                Neurological:   CVA                                    Endocrine:  Diabetes           Dermatological:  Skin Normal    Psych:   anxiety                 Physical Exam  General: Well nourished, Cooperative, Alert and Oriented    Airway:  Mallampati: II / II  Mouth Opening: Normal  TM Distance: Normal  Tongue: Normal  Neck ROM: Normal ROM    Dental:  Intact        Anesthesia Plan  Type of Anesthesia, risks & benefits discussed:    Anesthesia Type: Gen ETT, Gen Supraglottic Airway, Gen Natural Airway  Intra-op Monitoring Plan: Standard ASA Monitors  Post Op Pain Control Plan: IV/PO Opioids PRN and peripheral nerve block  Induction:  IV  Airway Plan: Direct, Post-Induction  Informed Consent: Informed consent signed with the Patient and all parties understand the risks and agree  with anesthesia plan.  All questions answered.   ASA Score: 3  Day of Surgery Review of History & Physical: I have interviewed and examined the patient. I have reviewed the patient's H&P dated:     Ready For Surgery From Anesthesia Perspective.     .           [1]   Patient Active Problem List  Diagnosis    Right knee pain    Difficulty walking    Internal derangement of right knee    BMI 50.0-59.9, adult    Tear of lateral meniscus of right knee, current

## 2025-03-12 RX ORDER — NAPROXEN SODIUM 220 MG/1
81 TABLET, FILM COATED ORAL DAILY
COMMUNITY

## 2025-03-12 RX ORDER — RANOLAZINE 500 MG/1
500 TABLET, EXTENDED RELEASE ORAL 2 TIMES DAILY
COMMUNITY

## 2025-03-12 RX ORDER — SODIUM CHLORIDE 1 G/1
1000 TABLET ORAL
COMMUNITY

## 2025-03-12 RX ORDER — ESOMEPRAZOLE MAGNESIUM 40 MG/1
40 CAPSULE, DELAYED RELEASE ORAL
COMMUNITY

## 2025-03-12 RX ORDER — ILOPERIDONE 6 MG/1
TABLET ORAL
COMMUNITY

## 2025-03-17 ENCOUNTER — PATIENT MESSAGE (OUTPATIENT)
Dept: SURGERY | Facility: HOSPITAL | Age: 35
End: 2025-03-17
Payer: MEDICAID

## 2025-03-17 NOTE — H&P
Interval H&P    Patient seen and examined in preop holding area. No changes to history or exam other than noted below.    To OR today for left knee arthroscopy with meniscus repair versus debridement with Dr. Jarrett.  -----------------------------------------    [facility] Orthopaedic Surgery Clinic Note     In brief, Katelyn Lopez is a 34 y.o. female BMI 49.5, with diabetes presents to clinic for initial evaluation of left knee pain.  She did undergo a right knee arthroscopy with lateral meniscectomy on 2023.  She presents today due to pain in her left knee that she states it is similar to how the right knee was before surgery.  Denies trauma prior to the onset.  Has pain in the front of the knee globally.  Also has numbness tingling traveling down both legs.     Interval history   Patient presents today for routine follow-up of left knee pain.  She is here to review her MRI findings.  She continues to have pain in the left knee.  She does endorse some catching and locking.  She would like to proceed with surgery.        PMH:        Past Medical History:   Diagnosis Date    Asthma      CHF (congestive heart failure) 3 or 4 years ago    Congenital heart disease      Diabetes mellitus type I      Food allergy 2016    GERD (gastroesophageal reflux disease)      Hypertension      Mental disorder 2006     Anxiety    Migraine headache 3 yesrs    Obesity Kid    Respiratory distress      Seasonal allergies 2018     Peanuts    Sleep apnea 2018    Stroke           PSH:         Past Surgical History:   Procedure Laterality Date     SECTION        CHOLECYSTECTOMY   2016    GALLBLADDER SURGERY        GERD surgery        KNEE ARTHROSCOPY W/ MENISCECTOMY Right 2023     Procedure: ARTHROSCOPY, KNEE, WITH partial lateral meniscectomy;  Surgeon: Barrie Corona MD;  Location: [facility];  Service: Orthopedics;  Laterality: Right;  supine, leg younger, tourniquet    KNEE SURGERY        Right knee surgery     TONSILLECTOMY             SH:   Social History           Socioeconomic History    Marital status:    Tobacco Use    Smoking status: Never    Smokeless tobacco: Never   Substance and Sexual Activity    Alcohol use: Never    Drug use: Never    Sexual activity: Never   Social History Narrative     ** Merged History Encounter **               FH:          Family History   Problem Relation Name Age of Onset    Arthritis Mother Madie Kim      Asthma Mother Madie Kim      Diabetes Mother Madie Kim      Hypertension Mother Madie Kim      Heart disease Maternal Grandmother Vida Lopez      Stroke Maternal Grandmother Vida Lopez      Cancer Paternal Grandfather Augie Dia      Cancer Paternal Grandmother Vida Dia      Vision loss Paternal Grandmother Vida Dia      Arthritis Brother Sam Lopez      Asthma Brother Sam Lopez      Birth defects Brother Sam Lopez      Depression Brother Sam Lopez      Diabetes Brother Sam Lopez      Hypertension Brother Sam Lopez      Learning disabilities Brother Sam Lopez      Mental illness Brother Sam Lopez      Intellectual disability Brother Sam Lopez      Arthritis Maternal Aunt Kirsten Lopez, Solitario Lopez           Mainly whole family    Asthma Son Howard Miller            Allergies:         Review of patient's allergies indicates:   Allergen Reactions    Azithromycin      Bupropion      Choline,magnesium salicylate      Ciprofloxacin      Dulaglutide      Hydrochlorothiazide      Hydromorphone Itching and Other (See Comments)       Other reaction(s): Injection site burning     has had demerol previously with itching, alleviated with benadryl.    Insulin glargine Other (See Comments)    Liraglutide      Morphine      Peanut      Penicillins Other (See Comments)       Other reaction(s): vaginal burning    Sitagliptin      Sitagliptin phosphate Other (See Comments)    Sulfamethoxazole      Ziprasidone         Other  "reaction(s): "withdraw"    Cefixime Nausea Only         ROS:  Constitutional- no fever, fatigue, weakness  Eye- no vision loss, eye redness, drainage, or pain  ENMT- no sore throat, ear pain, sinus pain/congestion, nasal congestion/drainage  Respiratory- no cough, wheezing, or shortness of breath  CV- no chest pain, no palpitations, no edema  GI- no N/V/D; no abdominal pain     Physical Exam:      Vitals:     02/05/25 0956   BP: 109/72   Pulse: 100         General: NAD  Cardio: RRR by peripheral pulse  Pulm: Normal WOB on room air, symmetric chest rise  Abd: Soft, NT/ND     MSK:  Left knee:   No open wounds or superficial abrasions.  Tender to palpation at the inferior pole of the patella, patellar tendon, tibial tubercle.  Knee range of motion from 0-100 degrees of flexion.  Knee stable to varus valgus stress at 0 and 30° of flexion.  Negative anterior-posterior drawer.  Lachman's 1A. Positive Lupe's.  Neurovascular intact distally     Imaging:   X-ray left knee: Mild medial joint space narrowing     MRI 11/7/24  Impression:     Moderate-sized meniscal tear of the anterior horn of the lateral meniscus extending along the superior articular surface.  Associated adjacent anterior capsular and Hoffa's fat pad edema and complex parameniscal cyst formation.     Assessment:  34-year-old female BMI 49.57 with poorly-controlled diabetes who has lumbar radiculopathy and atraumatic left knee pain with mechanical symptoms with anterior lateral meniscus tear and associated cyst on MRI     Risks, benefits, alternatives discussed with the patient.  Risks including but not limited to infection, bleeding, damage to surrounding structures, need for further surgery, continued pain, continued symptoms were discussed.  Patient would like to proceed with left knee arthroscopy with meniscal debridement versus repair.  We will schedule her for 03/11/2025 with Dr. Jarrett        "

## 2025-03-18 ENCOUNTER — HOSPITAL ENCOUNTER (OUTPATIENT)
Facility: HOSPITAL | Age: 35
Discharge: HOME | End: 2025-03-18
Attending: ORTHOPAEDIC SURGERY | Admitting: ORTHOPAEDIC SURGERY
Payer: MEDICAID

## 2025-03-18 ENCOUNTER — ANESTHESIA (OUTPATIENT)
Dept: SURGERY | Facility: HOSPITAL | Age: 35
End: 2025-03-18
Payer: MEDICAID

## 2025-03-18 DIAGNOSIS — S83.282D ACUTE TEAR LATERAL MENISCUS, LEFT, SUBSEQUENT ENCOUNTER: ICD-10-CM

## 2025-03-18 LAB
B-HCG UR QL: NEGATIVE
CTP QC/QA: YES
POCT GLUCOSE: 232 MG/DL (ref 70–110)
POCT GLUCOSE: 255 MG/DL (ref 70–110)
POCT GLUCOSE: 264 MG/DL (ref 70–110)
POCT GLUCOSE: 275 MG/DL (ref 70–110)
POCT GLUCOSE: 288 MG/DL (ref 70–110)

## 2025-03-18 PROCEDURE — 81025 URINE PREGNANCY TEST: CPT | Performed by: NURSE PRACTITIONER

## 2025-03-18 PROCEDURE — 63600175 PHARM REV CODE 636 W HCPCS: Performed by: ANESTHESIOLOGY

## 2025-03-18 PROCEDURE — 27201423 OPTIME MED/SURG SUP & DEVICES STERILE SUPPLY: Performed by: ORTHOPAEDIC SURGERY

## 2025-03-18 PROCEDURE — 36000711: Performed by: ORTHOPAEDIC SURGERY

## 2025-03-18 PROCEDURE — 82962 GLUCOSE BLOOD TEST: CPT | Performed by: ORTHOPAEDIC SURGERY

## 2025-03-18 PROCEDURE — 25000003 PHARM REV CODE 250: Performed by: ORTHOPAEDIC SURGERY

## 2025-03-18 PROCEDURE — 37000008 HC ANESTHESIA 1ST 15 MINUTES: Performed by: ORTHOPAEDIC SURGERY

## 2025-03-18 PROCEDURE — 71000033 HC RECOVERY, INTIAL HOUR: Performed by: ORTHOPAEDIC SURGERY

## 2025-03-18 PROCEDURE — 25000003 PHARM REV CODE 250: Performed by: NURSE ANESTHETIST, CERTIFIED REGISTERED

## 2025-03-18 PROCEDURE — 71000016 HC POSTOP RECOV ADDL HR: Performed by: ORTHOPAEDIC SURGERY

## 2025-03-18 PROCEDURE — 71000015 HC POSTOP RECOV 1ST HR: Performed by: ORTHOPAEDIC SURGERY

## 2025-03-18 PROCEDURE — 37000009 HC ANESTHESIA EA ADD 15 MINS: Performed by: ORTHOPAEDIC SURGERY

## 2025-03-18 PROCEDURE — 63600175 PHARM REV CODE 636 W HCPCS: Performed by: NURSE ANESTHETIST, CERTIFIED REGISTERED

## 2025-03-18 PROCEDURE — 29881 ARTHRS KNE SRG MNISECTMY M/L: CPT | Mod: LT,,, | Performed by: ORTHOPAEDIC SURGERY

## 2025-03-18 PROCEDURE — 36000710: Performed by: ORTHOPAEDIC SURGERY

## 2025-03-18 RX ORDER — MUPIROCIN 20 MG/G
OINTMENT TOPICAL
Status: DISCONTINUED | OUTPATIENT
Start: 2025-03-18 | End: 2025-03-18 | Stop reason: HOSPADM

## 2025-03-18 RX ORDER — DEXMEDETOMIDINE HYDROCHLORIDE 100 UG/ML
INJECTION, SOLUTION INTRAVENOUS
Status: DISCONTINUED | OUTPATIENT
Start: 2025-03-18 | End: 2025-03-18

## 2025-03-18 RX ORDER — DIPHENHYDRAMINE HYDROCHLORIDE 50 MG/ML
12.5 INJECTION, SOLUTION INTRAMUSCULAR; INTRAVENOUS ONCE AS NEEDED
Status: DISCONTINUED | OUTPATIENT
Start: 2025-03-18 | End: 2025-03-18 | Stop reason: HOSPADM

## 2025-03-18 RX ORDER — GLUCAGON 1 MG
1 KIT INJECTION
Status: DISCONTINUED | OUTPATIENT
Start: 2025-03-18 | End: 2025-03-18 | Stop reason: HOSPADM

## 2025-03-18 RX ORDER — ONDANSETRON HYDROCHLORIDE 2 MG/ML
INJECTION, SOLUTION INTRAVENOUS
Status: DISCONTINUED | OUTPATIENT
Start: 2025-03-18 | End: 2025-03-18

## 2025-03-18 RX ORDER — HYDROMORPHONE HYDROCHLORIDE 1 MG/ML
0.4 INJECTION, SOLUTION INTRAMUSCULAR; INTRAVENOUS; SUBCUTANEOUS EVERY 10 MIN PRN
Status: DISCONTINUED | OUTPATIENT
Start: 2025-03-18 | End: 2025-03-18 | Stop reason: HOSPADM

## 2025-03-18 RX ORDER — EPHEDRINE SULFATE 50 MG/ML
INJECTION, SOLUTION INTRAVENOUS
Status: DISCONTINUED | OUTPATIENT
Start: 2025-03-18 | End: 2025-03-18 | Stop reason: HOSPADM

## 2025-03-18 RX ORDER — MIDAZOLAM HYDROCHLORIDE 1 MG/ML
2 INJECTION INTRAMUSCULAR; INTRAVENOUS ONCE
Status: COMPLETED | OUTPATIENT
Start: 2025-03-18 | End: 2025-03-18

## 2025-03-18 RX ORDER — CEFAZOLIN SODIUM 1 G/3ML
INJECTION, POWDER, FOR SOLUTION INTRAMUSCULAR; INTRAVENOUS
Status: DISCONTINUED | OUTPATIENT
Start: 2025-03-18 | End: 2025-03-18

## 2025-03-18 RX ORDER — ACETAMINOPHEN 500 MG
500 TABLET ORAL EVERY 6 HOURS PRN
Qty: 30 TABLET | Refills: 0 | Status: SHIPPED | OUTPATIENT
Start: 2025-03-18

## 2025-03-18 RX ORDER — GABAPENTIN 300 MG/1
300 CAPSULE ORAL 3 TIMES DAILY
Qty: 90 CAPSULE | Refills: 0 | Status: SHIPPED | OUTPATIENT
Start: 2025-03-18 | End: 2025-04-17

## 2025-03-18 RX ORDER — KETOROLAC TROMETHAMINE 30 MG/ML
30 INJECTION, SOLUTION INTRAMUSCULAR; INTRAVENOUS ONCE AS NEEDED
Status: DISCONTINUED | OUTPATIENT
Start: 2025-03-18 | End: 2025-03-18 | Stop reason: HOSPADM

## 2025-03-18 RX ORDER — SODIUM CHLORIDE, SODIUM LACTATE, POTASSIUM CHLORIDE, CALCIUM CHLORIDE 600; 310; 30; 20 MG/100ML; MG/100ML; MG/100ML; MG/100ML
125 INJECTION, SOLUTION INTRAVENOUS CONTINUOUS
Status: ACTIVE | OUTPATIENT
Start: 2025-03-18 | End: 2025-03-18

## 2025-03-18 RX ORDER — PROPOFOL 10 MG/ML
VIAL (ML) INTRAVENOUS
Status: DISCONTINUED | OUTPATIENT
Start: 2025-03-18 | End: 2025-03-18

## 2025-03-18 RX ORDER — METHOCARBAMOL 500 MG/1
500 TABLET, FILM COATED ORAL 3 TIMES DAILY
Qty: 30 TABLET | Refills: 0 | Status: SHIPPED | OUTPATIENT
Start: 2025-03-18 | End: 2025-03-28

## 2025-03-18 RX ORDER — INSULIN ASPART 100 [IU]/ML
10 INJECTION, SOLUTION INTRAVENOUS; SUBCUTANEOUS ONCE
Status: COMPLETED | OUTPATIENT
Start: 2025-03-18 | End: 2025-03-18

## 2025-03-18 RX ORDER — ONDANSETRON HYDROCHLORIDE 2 MG/ML
4 INJECTION, SOLUTION INTRAVENOUS ONCE AS NEEDED
Status: DISCONTINUED | OUTPATIENT
Start: 2025-03-18 | End: 2025-03-18 | Stop reason: HOSPADM

## 2025-03-18 RX ORDER — FENTANYL CITRATE 50 UG/ML
INJECTION, SOLUTION INTRAMUSCULAR; INTRAVENOUS
Status: DISCONTINUED | OUTPATIENT
Start: 2025-03-18 | End: 2025-03-18

## 2025-03-18 RX ORDER — MELOXICAM 7.5 MG/1
7.5 TABLET ORAL DAILY
Qty: 15 TABLET | Refills: 0 | Status: SHIPPED | OUTPATIENT
Start: 2025-03-18

## 2025-03-18 RX ORDER — LIDOCAINE HYDROCHLORIDE 20 MG/ML
INJECTION INTRAVENOUS
Status: DISCONTINUED | OUTPATIENT
Start: 2025-03-18 | End: 2025-03-18

## 2025-03-18 RX ORDER — OXYCODONE AND ACETAMINOPHEN 5; 325 MG/1; MG/1
1 TABLET ORAL
Status: DISCONTINUED | OUTPATIENT
Start: 2025-03-18 | End: 2025-03-18 | Stop reason: HOSPADM

## 2025-03-18 RX ORDER — ROCURONIUM BROMIDE 10 MG/ML
INJECTION, SOLUTION INTRAVENOUS
Status: DISCONTINUED | OUTPATIENT
Start: 2025-03-18 | End: 2025-03-18

## 2025-03-18 RX ORDER — LABETALOL HYDROCHLORIDE 5 MG/ML
INJECTION, SOLUTION INTRAVENOUS
Status: DISCONTINUED | OUTPATIENT
Start: 2025-03-18 | End: 2025-03-18

## 2025-03-18 RX ORDER — OXYCODONE HYDROCHLORIDE 5 MG/1
5 TABLET ORAL EVERY 4 HOURS PRN
Qty: 30 TABLET | Refills: 0 | Status: SHIPPED | OUTPATIENT
Start: 2025-03-18 | End: 2025-03-25

## 2025-03-18 RX ORDER — IPRATROPIUM BROMIDE AND ALBUTEROL SULFATE 2.5; .5 MG/3ML; MG/3ML
3 SOLUTION RESPIRATORY (INHALATION) ONCE AS NEEDED
Status: DISCONTINUED | OUTPATIENT
Start: 2025-03-18 | End: 2025-03-18 | Stop reason: HOSPADM

## 2025-03-18 RX ADMIN — ROCURONIUM BROMIDE 30 MG: 10 INJECTION INTRAVENOUS at 10:03

## 2025-03-18 RX ADMIN — LABETALOL HYDROCHLORIDE 2.5 MG: 5 INJECTION INTRAVENOUS at 10:03

## 2025-03-18 RX ADMIN — SUGAMMADEX 400 MG: 100 INJECTION, SOLUTION INTRAVENOUS at 10:03

## 2025-03-18 RX ADMIN — ROCURONIUM BROMIDE 70 MG: 10 INJECTION INTRAVENOUS at 09:03

## 2025-03-18 RX ADMIN — LABETALOL HYDROCHLORIDE 5 MG: 5 INJECTION INTRAVENOUS at 10:03

## 2025-03-18 RX ADMIN — INSULIN ASPART 10 UNITS: 100 INJECTION, SOLUTION INTRAVENOUS; SUBCUTANEOUS at 11:03

## 2025-03-18 RX ADMIN — CEFAZOLIN 3 G: 330 INJECTION, POWDER, FOR SOLUTION INTRAMUSCULAR; INTRAVENOUS at 09:03

## 2025-03-18 RX ADMIN — PROPOFOL 300 MG: 10 INJECTION, EMULSION INTRAVENOUS at 09:03

## 2025-03-18 RX ADMIN — DEXMEDETOMIDINE 30 MCG: 200 INJECTION, SOLUTION INTRAVENOUS at 10:03

## 2025-03-18 RX ADMIN — SODIUM CHLORIDE: 9 INJECTION, SOLUTION INTRAVENOUS at 09:03

## 2025-03-18 RX ADMIN — FENTANYL CITRATE 50 MCG: 50 INJECTION INTRAMUSCULAR; INTRAVENOUS at 10:03

## 2025-03-18 RX ADMIN — FENTANYL CITRATE 50 MCG: 50 INJECTION INTRAMUSCULAR; INTRAVENOUS at 09:03

## 2025-03-18 RX ADMIN — MIDAZOLAM HYDROCHLORIDE 2 MG: 1 INJECTION, SOLUTION INTRAMUSCULAR; INTRAVENOUS at 08:03

## 2025-03-18 RX ADMIN — LIDOCAINE HYDROCHLORIDE 100 MG: 20 INJECTION INTRAVENOUS at 10:03

## 2025-03-18 RX ADMIN — LIDOCAINE HYDROCHLORIDE 100 MG: 20 INJECTION INTRAVENOUS at 09:03

## 2025-03-18 RX ADMIN — ONDANSETRON 4 MG: 2 INJECTION INTRAMUSCULAR; INTRAVENOUS at 10:03

## 2025-03-18 RX ADMIN — INSULIN ASPART 10 UNITS: 100 INJECTION, SOLUTION INTRAVENOUS; SUBCUTANEOUS at 08:03

## 2025-03-18 NOTE — PROGRESS NOTES
0753 Patient's CBG elevated at 288. No sliding scale orders noted. Voice message left at [phone] for Dr. Gaspar.   0800 Patient reported to this nurse that she was diagnosed with COVID 2 weeks ago, but has no symptoms at this time. Lungs clear bilaterally. Denies any fevers in the last 48 hours. Dr. Gaitan on the unit, I informed him of this information and he will speak with anesthesia and get back with me.  0805 Okay to proceed with surgery per Dr. Gaitan.  0817 Received orders for Insulin from Dr. Lam.  0827 Patient reported to this nurse that she is currently taking Macrobid for a bladder infection of which she has three remaining doses. Dr. Jarrett notified and he gives okay to proceed with surgery.

## 2025-03-18 NOTE — TRANSFER OF CARE
Anesthesia Transfer of Care Note    Patient: Katelyn Lopez    Procedure(s) Performed: Procedure(s) (LRB):  ARTHROSCOPY, KNEE (Left)    Patient location: PACU    Anesthesia Type: general    Transport from OR: Transported from OR on room air with adequate spontaneous ventilation    Post pain: adequate analgesia    Post assessment: no apparent anesthetic complications and tolerated procedure well    Post vital signs: stable    Level of consciousness: sedated    Nausea/Vomiting: no nausea/vomiting    Complications: none    Transfer of care protocol was followedComments: Report to Ivone BRAGG      Last vitals: Visit Vitals  /78   Pulse 93   Temp 36.3 °C (97.3 °F) (Temporal)   Resp 20   Wt 133.6 kg (294 lb 9.6 oz)   SpO2 100%   Breastfeeding No   BMI 50.57 kg/m²     
denies/no

## 2025-03-18 NOTE — OP NOTE
[facility] - Periop Services  Surgery Department  Operative Note    SUMMARY     Date of Procedure: 3/18/2025     Procedure:  Left knee arthroscopy with lateral meniscus debridement and cyst debridement    Surgeons and Role:     * Anshul Jarrett MD - Primary     * Asaf Morales MD - Resident - Assisting        Pre-Operative Diagnosis:  Left knee lateral meniscus tear and cyst    Post-Operative Diagnosis:  Same    Anesthesia: General    Operative Findings (including complications, if any):   Patellofemoral joint:  No degenerative changes  Medial gutter:  No loose bodies  Lateral gutter:  No loose bodies  Medial compartment:  No degenerative changes, no meniscus tear  Lateral compartment:  No degenerative changes, fraying of anterior portion of the meniscus with cyst  ACL:  Intact  PCL:  Intact    Description of Technical Procedures:  Patient was taken to the OR placed supine on the OR table.  The left lower extremity was prepped and draped in normal sterile fashion.  A time-out was held and the correct patient, extremity, and procedure were confirmed.  Preoperative antibiotics were given.    Standard anterolateral portal was made.  The trocar was entered into the patellofemoral space.  Standard diagnostic arthroscopy was performed and a medial portal was established.  See above findings for diagnostic arthroscopy.  The medial compartment was probed and the meniscus did not have any tears.  There were no significant degenerative changes in the medial or lateral compartments.  The ACL and PCL were intact.  There was a cyst along the anterior portion of the lateral meniscus near the root.  The cyst was debrided exposing the meniscal root.  The meniscal root was then probed and noted to be intact.  The lateral compartment was entered and demonstrated no other meniscus tears.  There was a little bit of fraying of the anterior portion of the body of the meniscus that was debrided.  Wounds were closed with 4 0  Vicryl rapide suture.  Sterile dressing with Xeroform 4x4s ABD and cast padding Ace was applied.        Estimated Blood Loss (EBL): 5 mL           Implants: * No implants in log *    Specimens:   Specimen (24h ago, onward)      None                    Condition: Good    Disposition: PACU - hemodynamically stable.    Plan   Follow up 2 weeks for wound check and suture removal   Weightbearing as tolerated left lower extremity     Asaf Morales

## 2025-03-18 NOTE — DISCHARGE INSTRUCTIONS
"Ortho    · Keep follow up appointment on _March 31, 2025 @ 9:30 AM_at [facility].    `  Resume home medications unless otherwise instructed by your doctor.     · Take pain medications as prescribed.  The physician wants you to take the following medications:  1.) Acetaminophen 500mg every 6 hours on a schedule   2.) Take Meloxicam once daily, every day (best if taken with food).     3.) Gabapentin (for nerve pain caused by surgery), take three rimes daily every day, about 8 hours apart  4.) Methocarbamol (muscle relaxer), take 3 times daily, about 8 hours apart.  5.) Oxycodone is a narcotic for the most severe pain, this medicine is only taken "as needed"  ` · Keep knee elevated on pillow higher than the level of your heart,  to decrease pain and swelling.    · No driving or consuming alcohol for the next 24 hours or while taking narcotic pain medicine.    · May apply ice pack to surgical area for 20 minutes at a time 6-8 times per day.    · Dressing: Leave gauze dressing  clean and dry until follow up appointment.    ·       · Notify MD of any moderate to severe pain unrelieved by pain medicine or for any signs of infection including fever above 100.4, excessive redness or swelling, yellow/green foul- smelling drainage, nausea or vomiting. Call clinic at: [phone]. After business hours, if your concern is an emergency, call 911 or report to your nearest emergency room.    · Thanks for choosing [facility]! Have a smooth recovery!   "

## 2025-03-18 NOTE — DISCHARGE SUMMARY
[facility]  Brief Operative Note    Surgery Date:  3/18/2025    Surgeon(s) and Role:     Anshul Jarrett MD - Primary    Assisting Surgeon: Asaf Morales MD    Pre-op Diagnosis:  Left knee lateral meniscus tear and cyst    Post-op Diagnosis:    Post-Op Diagnosis Codes:     * Acute tear lateral meniscus, left, subsequent encounter [S83.282D]    Procedure(s) (LRB):  ARTHROSCOPY,KNEE,WITH MENISCUS REPAIR VERSUS DEBRIDEMENT (Left)    Anesthesia: Choice    Operative Findings: see op note    Estimated Blood Loss: see full op note          Specimens:   Specimen (24h ago, onward)      None              Discharge Note    OUTCOME: Patient tolerated treatment/procedure well without complication and is now ready for discharge.    DISPOSITION: Home or Self Care    FINAL DIAGNOSIS:  see op note    FOLLOWUP: In clinic    DISCHARGE INSTRUCTIONS:  see note

## 2025-03-18 NOTE — ANESTHESIA PROCEDURE NOTES
Intubation    Date/Time: 3/18/2025 9:47 AM    Performed by: Gokul Bustos CRNA  Authorized by: Vimal Lam MD    Intubation:     Induction:  Intravenous    Intubated:  Postinduction    Mask Ventilation:  Easy with oral airway    Attempts:  1    Attempted By:  CRNA    Difficult Airway Encountered?: No      Airway Device:  Supraglottic airway/LMA    Airway Device Size:  4.0    Style/Cuff Inflation:  Cuffed (inflated to minimal occlusive pressure)    Inflation Amount (mL):  18    Placement Verified By:  Capnometry    Complicating Factors:  None    Findings Post-Intubation:  BS equal bilateral

## 2025-03-18 NOTE — ANESTHESIA PROCEDURE NOTES
Intubation    Date/Time: 3/18/2025 9:47 AM    Performed by: Gokul Bustos CRNA  Authorized by: Vimal Lam MD    Intubation:     Induction:  Intravenous    Intubated:  Postinduction    Mask Ventilation:  Easy with oral airway    Attempts:  1    Attempted By:  CRNA    Method of Intubation:  Direct    Blade:  Macdonald 2    Laryngeal View Grade: Grade IIA - cords partially seen      Difficult Airway Encountered?: No      Complications:  None    Airway Device:  Oral endotracheal tube    Airway Device Size:  7.5    Style/Cuff Inflation:  Cuffed (inflated to minimal occlusive pressure)    Inflation Amount (mL):  6    Tube secured:  21    Secured at:  The lips    Placement Verified By:  Capnometry    Complicating Factors:  None    Findings Post-Intubation:  BS equal bilateral and atraumatic/condition of teeth unchanged

## 2025-03-18 NOTE — ANESTHESIA POSTPROCEDURE EVALUATION
Anesthesia Post Evaluation    Patient: Katelyn Lopez    Procedure(s) Performed: Procedure(s) (LRB):  ARTHROSCOPY, KNEE (Left)    Final Anesthesia Type: general      Patient location during evaluation: PACU  Patient participation: Yes- Able to Participate  Level of consciousness: awake and alert and oriented  Post-procedure vital signs: reviewed and stable  Pain management: adequate  Airway patency: patent    PONV status at discharge: No PONV  Anesthetic complications: no      Cardiovascular status: blood pressure returned to baseline  Respiratory status: unassisted, spontaneous ventilation and room air  Hydration status: euvolemic  Follow-up not needed.              Vitals Value Taken Time   /98 03/18/25 12:50   Temp 36.7 °C (98.1 °F) 03/18/25 12:50   Pulse 101 03/18/25 12:50   Resp 19 03/18/25 12:50   SpO2 98 % 03/18/25 12:50         Event Time   Out of Recovery 11:42:00         Pain/Bjorn Score: Bjorn Score: 9 (3/18/2025 11:45 AM)  Modified Bjorn Score: 18 (3/18/2025 12:50 PM)

## 2025-03-18 NOTE — LETTER
March 18, 2025         [address] [facility] [address]  Phone: [phone]  Fax: [phone]       Patient: Katelyn Lopez   YOB: 1990  Date of Visit: 03/18/2025    To Whom It May Concern:    Niranjan Lopez  was at [facility] on 03/18/2025. Please excuse her son, Howard Miller, as patient was unable to get patient there or back from school.  The patient may return to work/school on Wednesday, March 19, 2025 with no restrictions. If you have any questions or concerns, or if I can be of further assistance, please do not hesitate to contact me.    Sincerely,    Tom King RN

## 2025-03-19 VITALS
SYSTOLIC BLOOD PRESSURE: 146 MMHG | WEIGHT: 293 LBS | OXYGEN SATURATION: 98 % | DIASTOLIC BLOOD PRESSURE: 98 MMHG | RESPIRATION RATE: 19 BRPM | TEMPERATURE: 98 F | BODY MASS INDEX: 50.57 KG/M2 | HEART RATE: 101 BPM

## 2025-03-24 ENCOUNTER — HOSPITAL ENCOUNTER (EMERGENCY)
Facility: HOSPITAL | Age: 35
Discharge: HOME | End: 2025-03-24
Attending: EMERGENCY MEDICINE
Payer: MEDICAID

## 2025-03-24 ENCOUNTER — TELEPHONE (OUTPATIENT)
Dept: ORTHOPEDICS | Facility: CLINIC | Age: 35
End: 2025-03-24
Payer: MEDICAID

## 2025-03-24 VITALS
RESPIRATION RATE: 18 BRPM | BODY MASS INDEX: 50.02 KG/M2 | WEIGHT: 293 LBS | OXYGEN SATURATION: 99 % | HEART RATE: 93 BPM | SYSTOLIC BLOOD PRESSURE: 132 MMHG | DIASTOLIC BLOOD PRESSURE: 76 MMHG | TEMPERATURE: 98 F | HEIGHT: 64 IN

## 2025-03-24 DIAGNOSIS — M79.662 PAIN AND SWELLING OF LEFT LOWER LEG: ICD-10-CM

## 2025-03-24 DIAGNOSIS — M79.89 PAIN AND SWELLING OF LEFT LOWER LEG: ICD-10-CM

## 2025-03-24 LAB
ALBUMIN SERPL-MCNC: 3.5 G/DL (ref 3.5–5)
ALBUMIN/GLOB SERPL: 0.9 RATIO (ref 1.1–2)
ALP SERPL-CCNC: 114 UNIT/L (ref 40–150)
ALT SERPL-CCNC: 16 UNIT/L (ref 0–55)
ANION GAP SERPL CALC-SCNC: 7 MEQ/L
AST SERPL-CCNC: 13 UNIT/L (ref 11–45)
B-HCG UR QL: NEGATIVE
BASOPHILS # BLD AUTO: 0.05 X10(3)/MCL
BASOPHILS NFR BLD AUTO: 0.6 %
BILIRUB SERPL-MCNC: 0.4 MG/DL
BUN SERPL-MCNC: 10.7 MG/DL (ref 7–18.7)
CALCIUM SERPL-MCNC: 9.8 MG/DL (ref 8.4–10.2)
CHLORIDE SERPL-SCNC: 104 MMOL/L (ref 98–107)
CO2 SERPL-SCNC: 25 MMOL/L (ref 22–29)
CREAT SERPL-MCNC: 1.13 MG/DL (ref 0.55–1.02)
CREAT/UREA NIT SERPL: 9
CTP QC/QA: YES
D DIMER PPP IA.FEU-MCNC: 1.65 UG/ML FEU (ref 0–0.5)
EOSINOPHIL # BLD AUTO: 0.16 X10(3)/MCL (ref 0–0.9)
EOSINOPHIL NFR BLD AUTO: 1.8 %
ERYTHROCYTE [DISTWIDTH] IN BLOOD BY AUTOMATED COUNT: 13.1 % (ref 11.5–17)
GFR SERPLBLD CREATININE-BSD FMLA CKD-EPI: >60 ML/MIN/1.73/M2
GLOBULIN SER-MCNC: 3.7 GM/DL (ref 2.4–3.5)
GLUCOSE SERPL-MCNC: 301 MG/DL (ref 74–100)
HCT VFR BLD AUTO: 38.6 % (ref 37–47)
HGB BLD-MCNC: 12.7 G/DL (ref 12–16)
HOLD SPECIMEN: NORMAL
IMM GRANULOCYTES # BLD AUTO: 0.01 X10(3)/MCL (ref 0–0.04)
IMM GRANULOCYTES NFR BLD AUTO: 0.1 %
LYMPHOCYTES # BLD AUTO: 3.04 X10(3)/MCL (ref 0.6–4.6)
LYMPHOCYTES NFR BLD AUTO: 34.4 %
MCH RBC QN AUTO: 27.7 PG (ref 27–31)
MCHC RBC AUTO-ENTMCNC: 32.9 G/DL (ref 33–36)
MCV RBC AUTO: 84.3 FL (ref 80–94)
MONOCYTES # BLD AUTO: 0.59 X10(3)/MCL (ref 0.1–1.3)
MONOCYTES NFR BLD AUTO: 6.7 %
NEUTROPHILS # BLD AUTO: 4.98 X10(3)/MCL (ref 2.1–9.2)
NEUTROPHILS NFR BLD AUTO: 56.4 %
NRBC BLD AUTO-RTO: 0 %
PLATELET # BLD AUTO: 312 X10(3)/MCL (ref 130–400)
PMV BLD AUTO: 9.9 FL (ref 7.4–10.4)
POTASSIUM SERPL-SCNC: 4.1 MMOL/L (ref 3.5–5.1)
PROT SERPL-MCNC: 7.2 GM/DL (ref 6.4–8.3)
RBC # BLD AUTO: 4.58 X10(6)/MCL (ref 4.2–5.4)
SODIUM SERPL-SCNC: 136 MMOL/L (ref 136–145)
WBC # BLD AUTO: 8.83 X10(3)/MCL (ref 4.5–11.5)

## 2025-03-24 PROCEDURE — 81025 URINE PREGNANCY TEST: CPT | Performed by: NURSE PRACTITIONER

## 2025-03-24 PROCEDURE — 99284 EMERGENCY DEPT VISIT MOD MDM: CPT | Mod: 25

## 2025-03-24 PROCEDURE — 80053 COMPREHEN METABOLIC PANEL: CPT | Performed by: NURSE PRACTITIONER

## 2025-03-24 PROCEDURE — 85025 COMPLETE CBC W/AUTO DIFF WBC: CPT | Performed by: NURSE PRACTITIONER

## 2025-03-24 PROCEDURE — 85379 FIBRIN DEGRADATION QUANT: CPT | Performed by: NURSE PRACTITIONER

## 2025-03-24 RX ORDER — BACLOFEN 10 MG/1
10 TABLET ORAL 3 TIMES DAILY PRN
Qty: 21 TABLET | Refills: 0 | Status: SHIPPED | OUTPATIENT
Start: 2025-03-24

## 2025-03-24 NOTE — ED PROVIDER NOTES
"Encounter Date: 3/24/2025       History     Chief Complaint   Patient presents with    Leg Pain     Reports RLE pain. States had a popliteal cyst removed last week. Started experiencing leg pain and tingling 2 days ago, called ortho today and was told to go to ER for blood clot rule out     Patient is a 34-year-old female presents for evaluation left lower extremity pain.  Reports having an orthopedic procedure to her right knee on 03/18/2025.  Since surgery occurred, she reports worsening episodes of swelling the calf of affected leg well as pain to area.  Reports trying to keep his extremity wrapped with compressions as instructed; however, she admits to not "wrapping it right at times." The patient denies redness to affected extremity, drainage from surgical sites, fever, chest pain, shortness of breath, or new trauma to affected extremity.  Patient has chronic medical conditions include CHF, diabetes, GERD, hypertension, anxiety, and asthma.      Review of patient's allergies indicates:   Allergen Reactions    Azithromycin     Bupropion     Choline,magnesium salicylate     Ciprofloxacin     Dulaglutide     Hydrochlorothiazide     Hydromorphone Itching and Other (See Comments)     Other reaction(s): Injection site burning    has had demerol previously with itching, alleviated with benadryl.    Insulin glargine Other (See Comments)    Liraglutide     Morphine     Peanut     Penicillins Other (See Comments)     Other reaction(s): vaginal burning    Sitagliptin     Sitagliptin phosphate Other (See Comments)    Sulfamethoxazole     Ziprasidone      Other reaction(s): "withdraw"    Cefixime Nausea Only     Past Medical History:   Diagnosis Date    Asthma     CHF (congestive heart failure) 3 or 4 years ago    Congenital heart disease     Diabetes mellitus type I     Food allergy 2016    GERD (gastroesophageal reflux disease)     Hypertension     Mental disorder 2006    Anxiety    Migraine headache 3 yesrs    Obesity " Kid    Respiratory distress     Seasonal allergies 2018    Peanuts    Sleep apnea 2018    Stroke      Past Surgical History:   Procedure Laterality Date    ARTHROSCOPY OF KNEE Left 3/18/2025    Procedure: ARTHROSCOPY, KNEE;  Surgeon: Anshul Jarrett MD;  Location: [facility];  Service: Orthopedics;  Laterality: Left;     SECTION      CHOLECYSTECTOMY  2016    GALLBLADDER SURGERY      GERD surgery      KNEE ARTHROSCOPY W/ MENISCECTOMY Right 2023    Procedure: ARTHROSCOPY, KNEE, WITH partial lateral meniscectomy;  Surgeon: Barrie Corona MD;  Location: [facility];  Service: Orthopedics;  Laterality: Right;  supine, leg younger, tourniquet    KNEE SURGERY      Right knee surgery    TONSILLECTOMY       Family History   Problem Relation Name Age of Onset    Arthritis Mother Madie Kim     Asthma Mother Madie Kim     Diabetes Mother Madie Kim     Hypertension Mother Madie Kim     Heart disease Maternal Grandmother Vida Lopez     Stroke Maternal Grandmother Vida Lopez     Cancer Paternal Grandfather Augie Dia     Cancer Paternal Grandmother Vida Dia     Vision loss Paternal Grandmother Vida Dia     Arthritis Brother Sam John     Asthma Brother Sam Lopez     Birth defects Brother Sam Lopez     Depression Brother Sam Lopez     Diabetes Brother Sam John     Hypertension Brother Sam John     Learning disabilities Brother Sam Lopez     Mental illness Brother Sam John     Intellectual disability Brother Sam John     Arthritis Maternal Aunt Kirsten Lopez, Solitario Lopez         Mainly whole family    Asthma Son Howard Miller Jr      Social History[1]  Review of Systems   Constitutional:  Negative for chills, diaphoresis, fatigue and fever.   HENT:  Negative for facial swelling, rhinorrhea, sinus pressure, sinus pain, sore throat and trouble swallowing.    Respiratory:  Negative for cough, chest tightness, shortness of breath and wheezing.     Cardiovascular:  Positive for leg swelling. Negative for chest pain and palpitations.   Gastrointestinal:  Negative for abdominal pain, diarrhea, nausea and vomiting.   Genitourinary:  Negative for dysuria, flank pain, frequency, hematuria and urgency.   Musculoskeletal:  Positive for myalgias. Negative for arthralgias, back pain and joint swelling.   Skin:  Negative for color change and rash.   Neurological:  Negative for dizziness, syncope, weakness and light-headedness.   Hematological:  Does not bruise/bleed easily.   All other systems reviewed and are negative.      Physical Exam     Initial Vitals [03/24/25 1403]   BP Pulse Resp Temp SpO2   -- 107 18 98.3 °F (36.8 °C) 98 %      MAP       --         Physical Exam    Nursing note and vitals reviewed.  Constitutional: She appears well-developed and well-nourished.   HENT:   Head: Normocephalic and atraumatic.   Nose: Nose normal. Mouth/Throat: Oropharynx is clear and moist.   Eyes: Conjunctivae and EOM are normal. Pupils are equal, round, and reactive to light.   Neck: Neck supple.   Normal range of motion.  Cardiovascular:  Normal rate, regular rhythm, normal heart sounds and intact distal pulses.           Pulmonary/Chest: Effort normal and breath sounds normal. No respiratory distress. She has no wheezes. She has no rhonchi. She has no rales. She exhibits no tenderness.   Abdominal: Abdomen is soft and flat. Bowel sounds are normal. She exhibits no distension. There is no abdominal tenderness. There is no rebound, no guarding, no tenderness at McBurney's point and negative Holguin's sign. negative psoas sign  Musculoskeletal:         General: Normal range of motion.      Cervical back: Normal range of motion and neck supple.        Legs:      Neurological: She is alert and oriented to person, place, and time. She has normal strength and normal reflexes.   Skin: Skin is warm and dry. Capillary refill takes less than 2 seconds.   Psychiatric: She has a normal  mood and affect. Her speech is normal and behavior is normal. Judgment and thought content normal.         ED Course   Procedures  Labs Reviewed   COMPREHENSIVE METABOLIC PANEL - Abnormal       Result Value    Sodium 136      Potassium 4.1      Chloride 104      CO2 25      Glucose 301 (*)     Blood Urea Nitrogen 10.7      Creatinine 1.13 (*)     Calcium 9.8      Protein Total 7.2      Albumin 3.5      Globulin 3.7 (*)     Albumin/Globulin Ratio 0.9 (*)     Bilirubin Total 0.4            ALT 16      AST 13      eGFR >60      Anion Gap 7.0      BUN/Creatinine Ratio 9     D DIMER, QUANTITATIVE - Abnormal    D-Dimer 1.65 (*)    CBC WITH DIFFERENTIAL - Abnormal    WBC 8.83      RBC 4.58      Hgb 12.7      Hct 38.6      MCV 84.3      MCH 27.7      MCHC 32.9 (*)     RDW 13.1      Platelet 312      MPV 9.9      Neut % 56.4      Lymph % 34.4      Mono % 6.7      Eos % 1.8      Basophil % 0.6      Imm Grans % 0.1      Neut # 4.98      Lymph # 3.04      Mono # 0.59      Eos # 0.16      Baso # 0.05      Imm Gran # 0.01      NRBC% 0.0     CBC W/ AUTO DIFFERENTIAL    Narrative:     The following orders were created for panel order CBC auto differential.  Procedure                               Abnormality         Status                     ---------                               -----------         ------                     CBC with Differential[[id number]]       Abnormal            Final result                 Please view results for these tests on the individual orders.   EXTRA TUBES    Narrative:     The following orders were created for panel order EXTRA TUBES.  Procedure                               Abnormality         Status                     ---------                               -----------         ------                     Light Green Top Hold[[id number]]                            In process                 Lavender Top Hold[[id number]]                               In process                 Gold Top  Hold[[id number]]                                   In process                   Please view results for these tests on the individual orders.   LIGHT GREEN TOP HOLD   LAVENDER TOP HOLD   GOLD TOP HOLD   POCT URINE PREGNANCY    POC Preg Test, Ur Negative       Acceptable Yes            Imaging Results    None          Medications - No data to display  Medical Decision Making  Differential:   Dependent edema   DVT   Cellulitis    Amount and/or Complexity of Data Reviewed  Labs: ordered.               ED Course as of 03/24/25 1538   Mon Mar 24, 2025   1533 Upon evaluation of patient's diagnostic testing, a DVT is not appreciated affected extremity in spite of the patient having an elevation in her D-dimer.  With these findings I have discussed with the patient her need to follow up with her orthopedic physician for a repeat ultrasound in approximately 7 days.  Patient is to keep her affected extremity elevated at any time while she is sitting or supine.  And to continue to use compression dressings to affected extremity.  The patient is present to the nearest emergency room immediately if she develops shortness of breath, chest pain, or fever.  Patient has verbalized understanding and agreement with plan.  Patient denies any additional needs at this time. [JA]      ED Course User Index  [JA] Elton Villeda Jr., FNP                       This chart is generated using a voice recognition system. Grammatical and content areas may inadvertently be generated in error. Please contact me if you find a perceive any inappropriate information in this chart. Thank you.       Clinical Impression:  Final diagnoses:  [M79.662, M79.89] Pain and swelling of left lower leg          ED Disposition Condition    Discharge Stable          ED Prescriptions       Medication Sig Dispense Start Date End Date Auth. Provider    baclofen (LIORESAL) 10 MG tablet Take 1 tablet (10 mg total) by mouth 3 (three) times daily as needed  (muscle spasms). Hold all additional muscle relaxers while on this medication 21 tablet 3/24/2025 -- Elton Villeda Jr., HILLARY          Follow-up Information       Follow up With Specialties Details Why Contact Info    Barrie Marmolejo NP Family Medicine In 3 days  [address] [place] [address]  [phone]      [facility] Emergency Medicine In 3 days As needed, If symptoms worsen [address] [facility] [address] [phone]                 [1]   Social History  Tobacco Use    Smoking status: Never    Smokeless tobacco: Never   Substance Use Topics    Alcohol use: Never    Drug use: Never        Elton Villeda Jr., HILLARY  03/24/25 3831

## 2025-03-24 NOTE — DISCHARGE INSTRUCTIONS
Keep affected extremity elevated while sitting or supine.    Follow up with your orthopedic physician as planned.    Present to nearest emergency room immediately if you develop shortness of breath, chest pain, or fever.  Take prescribed medication as planned.

## 2025-03-24 NOTE — TELEPHONE ENCOUNTER
Patient called with c/o dressing being loose on left knee, had left knee arthroscopy on 3/18/25 also complained left calf pain and swelling around the ankle. Spoke with Dr. Gaitan, patient was advised to go to the ED for an evaluation for DVT,.advised to secure ace wrap around her knee. She voiced understanding of these instructions and will have someone drive her to the [facility] ED.

## 2025-04-04 ENCOUNTER — TELEPHONE (OUTPATIENT)
Dept: ORTHOPEDICS | Facility: CLINIC | Age: 35
End: 2025-04-04

## 2025-04-04 ENCOUNTER — OFFICE VISIT (OUTPATIENT)
Dept: ORTHOPEDICS | Facility: CLINIC | Age: 35
End: 2025-04-04
Payer: MEDICAID

## 2025-04-04 VITALS
TEMPERATURE: 98 F | OXYGEN SATURATION: 97 % | BODY MASS INDEX: 50.02 KG/M2 | HEIGHT: 64 IN | WEIGHT: 293 LBS | SYSTOLIC BLOOD PRESSURE: 101 MMHG | DIASTOLIC BLOOD PRESSURE: 64 MMHG | RESPIRATION RATE: 17 BRPM | HEART RATE: 111 BPM

## 2025-04-04 DIAGNOSIS — M25.562 CHRONIC PAIN OF LEFT KNEE: Primary | ICD-10-CM

## 2025-04-04 DIAGNOSIS — G89.29 CHRONIC PAIN OF LEFT KNEE: Primary | ICD-10-CM

## 2025-04-04 PROCEDURE — 99215 OFFICE O/P EST HI 40 MIN: CPT | Mod: PBBFAC

## 2025-04-04 NOTE — PROGRESS NOTES
Faculty Attestation: Katelyn Lopez  was seen at [facility] in the Orthopaedic Clinic in the outpatient department at a [facility]. Patient seen and evaluated at the time of the visit.  I participated in the management of the patient and was immediately available throughout the encounter. History of Present Illness, Physical Exam, and Assessment and Plan reviewed. Treatment plan is reasonable and appropriate. Compliance with treatment recommendations is important. No procedure was performed.     Anshul Jarrett MD  Orthopedic Surgery Chief

## 2025-04-04 NOTE — TELEPHONE ENCOUNTER
Patient called stating that she was told at the visit earlier if she needed a refill on mobic to let the  Know, she checked at home and she is almost out and would like a refill if possible. Please send to [facility] in [place]. Thanks.

## 2025-04-04 NOTE — PROGRESS NOTES
[facility]  Established Patient Office Visit  04/04/2025     Patient ID: Katelyn Lopez  YOB: 1990  MRN: [id number]    Chief Complaint: Post-op Evaluation of the Left Knee (Patient presents to clinic to be seen for post op of left knee.pt stated she is still in pain. At home therapy treatments includes prescription medication, ice, and elevation with moderate relief./)    Past Orthopaedic Surgeries:   Left Knee Scope with Lateral Meniscus Debridement 03/18/25  Right knee scope 04/11/2025    HPI:  Katelyn Lopez is a 34 y.o. female morbidly obese (BMI 52) with uncontrolled diabetes mellitus (last hemoglobin A1c per the patient was 9.6) presenting postoperatively for her left knee scope which was done about 3 weeks ago.  Patient is doing okay overall.  Patient went to the emergency room due to knee swelling which was determined to be normal postoperative.  DVT ultrasound was negative.  Patient denies any drainage from her incisions nor any fevers.  Patient is additionally here to evaluate her right knee which was scoped a few years ago.  She is having similar that she had prior to her scope.  There is some associated catching of the knee with movement.  Patient is actively working to get her blood sugar under control.    12 point ROS performed and negative except as above.     Past Medical History:    Past Medical History:   Diagnosis Date    Asthma     CHF (congestive heart failure) 3 or 4 years ago    Congenital heart disease     Diabetes mellitus type I     Food allergy 2016    GERD (gastroesophageal reflux disease)     Hypertension     Mental disorder 2006    Anxiety    Migraine headache 3 yesrs    Obesity Kid    Respiratory distress     Seasonal allergies 2018    Peanuts    Sleep apnea 2018    Stroke      Past Surgical History:   Procedure Laterality Date    ARTHROSCOPY OF KNEE Left 03/18/2025    Procedure: ARTHROSCOPY, KNEE;  Surgeon: Anshul Jarrett MD;  Location:  [facility] OR;  Service: Orthopedics;  Laterality: Left;     SECTION      Emergency C- Section    CHOLECYSTECTOMY  2016    GALLBLADDER SURGERY      GERD surgery      KNEE ARTHROSCOPY W/ MENISCECTOMY Right 2023    Procedure: ARTHROSCOPY, KNEE, WITH partial lateral meniscectomy;  Surgeon: Barrie Corona MD;  Location: [facility] OR;  Service: Orthopedics;  Laterality: Right;  supine, leg younger, tourniquet    KNEE SURGERY      Right knee surgery    TONSILLECTOMY       Family History   Problem Relation Name Age of Onset    Arthritis Mother Madie Kim     Asthma Mother Madie Kim     Diabetes Mother Madie Kim     Hypertension Mother Madie Kim     Heart disease Maternal Grandmother iVda Lopez     Stroke Maternal Grandmother Vida Lopez     Cancer Paternal Grandfather Augie Dia     Cancer Paternal Grandmother Vida Dia     Vision loss Paternal Grandmother Vida Dia     Arthritis Brother Asmleidy Lopez     Asthma Brother Sam John     Birth defects Brother Sam John     Depression Brother Sam John     Diabetes Brother Sam John     Hypertension Brother Sam John     Learning disabilities Brother Sam John     Mental illness Brother Sam John     Intellectual disability Brother Sam John     Arthritis Maternal Aunt Kirsten John, Solitario Lopez         Mainly whole family    Asthma Son Howard Miller       Social History[1]  Medication List with Changes/Refills   Current Medications    ACETAMINOPHEN (TYLENOL) 500 MG TABLET    Take 1 tablet (500 mg total) by mouth every 6 (six) hours as needed for Pain.    ADVAIR DISKUS 100-50 MCG/DOSE DISKUS INHALER    1 puff 2 (two) times daily.    ALBUTEROL (PROVENTIL/VENTOLIN HFA) 90 MCG/ACTUATION INHALER    1 puff every 3 (three) hours as needed.    ASPIRIN 81 MG CHEW    Take 81 mg by mouth once daily.    ATORVASTATIN (LIPITOR) 10 MG TABLET    Take 10 mg by mouth once daily.    AZELASTINE (ASTELIN) 137 MCG (0.1 %) NASAL SPRAY     2 sprays once daily.    BACLOFEN (LIORESAL) 10 MG TABLET    Take 1 tablet (10 mg total) by mouth 3 (three) times daily as needed (muscle spasms). Hold all additional muscle relaxers while on this medication    BELSOMRA 10 MG TAB    Take 1 tablet by mouth nightly as needed.    BETHANECHOL (URECHOLINE) 25 MG TAB    Take 1 tablet (25 mg total) by mouth 3 (three) times daily.    BUMETANIDE (BUMEX) 2 MG TABLET    Take 2 mg by mouth once daily.    CHOLECALCIFEROL, VITAMIN D3, 1,250 MCG (50,000 UNIT) CAPSULE    Take 50,000 Units by mouth every 7 days.    DEXCOM G6 SENSOR MACIE    SMARTSIG:Topical Every 10 Days    DEXCOM G6 TRANSMITTER MACIE    CHANGE TRANSMITTOR EVERY 90 DAYS    DIAZEPAM (VALIUM) 5 MG TABLET    Take 2.5 mg by mouth every 6 (six) hours as needed for Anxiety.    DICYCLOMINE (BENTYL) 20 MG TABLET    Take 20 mg by mouth every 6 (six) hours as needed.    DULOXETINE (CYMBALTA) 60 MG CAPSULE    Take 60 mg by mouth once daily.    EDLUAR 5 MG SUBL    Place 1 tablet under the tongue every evening.    ESOMEPRAZOLE (NEXIUM) 40 MG CAPSULE    Take 40 mg by mouth before breakfast.    FIASP FLEXTOUCH U-100 INSULIN 100 UNIT/ML (3 ML) INPN    Inject 60 Units into the skin 3 (three) times daily with meals.    FIASP U-100 INSULIN 100 UNIT/ML SOLN    SMARTSIG:160 Unit(s) SUB-Q Daily    FLUCONAZOLE (DIFLUCAN) 100 MG TABLET    Take 100 mg by mouth.    FLUCONAZOLE (DIFLUCAN) 150 MG TAB    Take 150 mg by mouth once.    FLUDROCORTISONE (FLORINEF) 0.1 MG TAB    Take 100 mcg by mouth every morning.    GABAPENTIN (NEURONTIN) 300 MG CAPSULE    Take 1 capsule (300 mg total) by mouth 3 (three) times daily.    GLIPIZIDE (GLUCOTROL) 10 MG TABLET    Take 20 mg by mouth 2 (two) times daily.    IBSRELA 50 MG TAB    Take 1 tablet by mouth 2 (two) times daily.    ILOPERIDONE (FANAPT) 6 MG TAB    Take by mouth.    INSULIN PUMP CONTROLLER (OMNIPOD DASH PDM KIT, GEN 4, MISC)    by Misc.(Non-Drug; Combo Route) route.    ISOSORBIDE MONONITRATE  (ISMO,MONOKET) 10 MG TABLET    Take 10 mg by mouth once daily.    ISOSORBIDE MONONITRATE (ISMO,MONOKET) 20 MG TAB    Take 10 mg by mouth every evening.    KETOCONAZOLE (NIZORAL) 2 % SHAMPOO    Apply topically every 7 days.    LACTULOSE (CHRONULAC) 10 GRAM/15 ML SOLUTION    Take 15 mLs by mouth 2 (two) times daily.    LAMOTRIGINE (LAMICTAL) 25 MG TABLET    Take 25 mg by mouth. Increase doage to 50 mg 2023    LEVEMIR FLEXTOUCH U-100 INSULN 100 UNIT/ML (3 ML) INPN PEN    Inject into the skin.    LEVOCETIRIZINE (XYZAL) 5 MG TABLET    Take 5 mg by mouth.    LUBIPROSTONE (AMITIZA) 24 MCG CAP    Take 24 mcg by mouth 2 (two) times daily.    MAGNESIUM OXIDE (MAG-OX) 400 MG (241.3 MG MAGNESIUM) TABLET    Take 400 mg by mouth 2 (two) times daily.    MEDROXYPROGESTERONE (PROVERA) 10 MG TABLET    Take by mouth 3 (three) times daily with meals.    MELOXICAM (MOBIC) 7.5 MG TABLET    Take 1 tablet (7.5 mg total) by mouth once daily.    METFORMIN (GLUCOPHAGE-XR) 500 MG ER 24HR TABLET    SMARTSI Tablet(s) By Mouth    METOPROLOL SUCCINATE (TOPROL-XL) 25 MG 24 HR TABLET    Take by mouth.    METOPROLOL SUCCINATE (TOPROL-XL) 50 MG 24 HR TABLET    Take 50 mg by mouth 2 (two) times daily.    MIRABEGRON (MYRBETRIQ) 50 MG TB24    Take 1 tablet (50 mg total) by mouth once daily.    MONTELUKAST (SINGULAIR) 10 MG TABLET    Take 10 mg by mouth every morning.    MOUNJARO 5 MG/0.5 ML PNIJ        MUPIROCIN (BACTROBAN) 2 % OINTMENT    3 (three) times daily.    NOVOLOG FLEXPEN U-100 INSULIN 100 UNIT/ML (3 ML) INPN PEN    Inject into the skin.    ONDANSETRON (ZOFRAN) 4 MG TABLET    Take 1 tablet (4 mg total) by mouth 2 (two) times daily.    ONDANSETRON (ZOFRAN) 8 MG TABLET    8 mg.    ONDANSETRON (ZOFRAN-ODT) 8 MG TBDL    8 mg every 6 (six) hours as needed.    ONETOUCH VERIO REFLECT METER MISC    use as directed    ONETOUCH VERIO TEST STRIPS STRP    5 (five) times daily.    OZEMPIC 0.25 MG OR 0.5 MG(2 MG/1.5 ML) PEN INJECTOR    Inject into the  "skin.    OZEMPIC 1 MG/DOSE (4 MG/3 ML)    SMARTSI Milligram(s) Topical Once a Week    PANTOPRAZOLE (PROTONIX) 40 MG TABLET    Take 40 mg by mouth.    PEN NEEDLE 31 GAUGE X 16" NDLE    SMARTSI Pen Needle SUB-Q 5 Times Daily    POTASSIUM CHLORIDE (KLOR-CON) 8 MEQ TBSR    Take 8 mEq by mouth 2 (two) times daily.    PREDNISOLONE ACETATE (PRED FORTE) 1 % DRPS    Place 1 drop into the right eye 4 (four) times daily.    PROMETHAZINE (PHENERGAN) 25 MG TABLET    Take 25 mg by mouth every 6 (six) hours as needed.    RAMELTEON (ROZEREM) 8 MG TABLET    Take 8 mg by mouth every evening.    RANOLAZINE (RANEXA) 500 MG TB12    Take 500 mg by mouth 2 (two) times daily.    RANOLAZINE (RANEXA) 500 MG TB12    Take 500 mg by mouth 2 (two) times daily.    REXULTI 2 MG TAB    Take 1 tablet by mouth.    ROPINIROLE (REQUIP) 0.25 MG TABLET    Take 0.25 mg by mouth every evening.    SODIUM CHLORIDE 1,000 MG TBSO ORAL TABLET    Take 1,000 mg by mouth.    SPIRONOLACTONE (ALDACTONE) 25 MG TABLET    Take 25 mg by mouth once daily.    TERBINAFINE HCL (LAMISIL) 250 MG TABLET    Take 250 mg by mouth.    TIADYLT  MG CS24    Take by mouth.    TIZANIDINE (ZANAFLEX) 4 MG TABLET    Take 4 mg by mouth 3 (three) times daily.    TOLTERODINE (DETROL LA) 4 MG 24 HR CAPSULE    Take 1 capsule (4 mg total) by mouth once daily.    TRESIBA FLEXTOUCH U-200 200 UNIT/ML (3 ML) INSULIN PEN    Inject 100 Units into the skin once daily.    TRIAMCINOLONE ACETONIDE 0.1% (KENALOG) 0.1 % LOTN        TRULANCE 3 MG TAB    Take 1 tablet by mouth once daily.    VALACYCLOVIR (VALTREX) 500 MG TABLET    Take 500 mg by mouth.    VOQUEZNA 20 MG TAB    Take 1 tablet by mouth.     Review of patient's allergies indicates:   Allergen Reactions    Azithromycin     Bupropion     Choline,magnesium salicylate     Ciprofloxacin     Dulaglutide     Hydrochlorothiazide     Hydromorphone Itching and Other (See Comments)     Other reaction(s): Injection site burning    has had " "demerol previously with itching, alleviated with benadryl.    Insulin glargine Other (See Comments)    Liraglutide     Morphine     Peanut     Penicillins Other (See Comments)     Other reaction(s): vaginal burning    Sitagliptin     Sitagliptin phosphate Other (See Comments)    Sulfamethoxazole     Ziprasidone      Other reaction(s): "withdraw"    Cefixime Nausea Only       Physical Exam:  Left Lower extremity:  Well-healing incisions without any dehiscence or drainage  Mild tenderness to palpation the lateral knee joint line  Motor intact: EHL/FHL/TA/GSC  SILT: DP/SP/T/Gray/Sa, patient has a small area of numbness around her lateral arthroscopy port  About 5° short of full extension to 100° flexion, slow due to pain  Palpable DP pulse    Right Lower extremity:  No gross deformity, open wounds, abrasions, effusions  Tenderness to palpation of the lateral knee joint line  Motor intact: EHL/FHL/TA/GSC  SILT: DP/SP/T/Gray/Sa  Knee range of motion full extension to approximately 120° flexion  Positive Lupe  Palpable DP pulse    Imaging independently interpreted:  No new imaging today    Assessment and Plan:  Katelyn Lopez is a 34 y.o. female morbidly obese with a uncontrolled diabetes with a history of bilateral knee arthroscopy due to lateral meniscus tears presenting for follow up of her left knee as well as re-evaluation of her right knee.    - we offered the patient a steroid injection however she refused stating her blood sugars out of control, we stated that her sugar would only be affected by this for a week but agreed that if she was apprehensive at all we can hold off the injection for now  - encouraged the patient to continue working on blood sugar control as well as weight loss as these are contributing factors  - the left knee appears to be progressing as expected in the postoperative.  - the right knee appears to have a recurrence of some mild pain consistent with her previous right knee pain  - we may " obtain right knee radiographs at next appointment  - referral to physical therapy for bilateral knee range of motion and strengthening  - we will re-evaluate both knees at the next appointment in 8 weeks    Joe Junior MD  [employer] Orthopedic Surgery PGY-3                     [1]   Social History  Socioeconomic History    Marital status:    Tobacco Use    Smoking status: Never    Smokeless tobacco: Never   Substance and Sexual Activity    Alcohol use: Never    Drug use: Never    Sexual activity: Yes   Social History Narrative    ** Merged History Encounter **

## 2025-04-11 DIAGNOSIS — N39.41 URGE INCONTINENCE: ICD-10-CM

## 2025-04-13 RX ORDER — MIRABEGRON 50 MG/1
50 TABLET, FILM COATED, EXTENDED RELEASE ORAL DAILY
Qty: 30 TABLET | Refills: 7 | Status: SHIPPED | OUTPATIENT
Start: 2025-04-13 | End: 2026-04-13

## 2025-05-07 DIAGNOSIS — N39.41 URGE INCONTINENCE: ICD-10-CM

## 2025-05-07 RX ORDER — TOLTERODINE 4 MG/1
4 CAPSULE, EXTENDED RELEASE ORAL DAILY
Qty: 90 CAPSULE | Refills: 1 | Status: SHIPPED | OUTPATIENT
Start: 2025-05-07 | End: 2025-11-03

## 2025-05-21 ENCOUNTER — HOSPITAL ENCOUNTER (OUTPATIENT)
Dept: RADIOLOGY | Facility: HOSPITAL | Age: 35
Discharge: HOME | End: 2025-05-21
Payer: MEDICAID

## 2025-05-21 DIAGNOSIS — R10.84 ABDOMINAL PAIN, GENERALIZED: ICD-10-CM

## 2025-05-21 DIAGNOSIS — R19.7 DIARRHEA OF PRESUMED INFECTIOUS ORIGIN: ICD-10-CM

## 2025-05-21 DIAGNOSIS — K59.00 COLONIC CONSTIPATION: ICD-10-CM

## 2025-05-21 DIAGNOSIS — L29.0 PRURITUS ANI: ICD-10-CM

## 2025-05-21 PROCEDURE — 74018 RADEX ABDOMEN 1 VIEW: CPT | Mod: TC

## 2025-07-07 ENCOUNTER — HOSPITAL ENCOUNTER (OUTPATIENT)
Dept: RADIOLOGY | Facility: HOSPITAL | Age: 35
Discharge: HOME | End: 2025-07-07
Attending: INTERNAL MEDICINE
Payer: MEDICAID

## 2025-07-07 DIAGNOSIS — K59.00 COLONIC CONSTIPATION: ICD-10-CM

## 2025-07-07 DIAGNOSIS — K59.00 COLONIC CONSTIPATION: Primary | ICD-10-CM

## 2025-07-07 PROCEDURE — 74018 RADEX ABDOMEN 1 VIEW: CPT | Mod: TC

## 2025-07-09 ENCOUNTER — HOSPITAL ENCOUNTER (OUTPATIENT)
Dept: RADIOLOGY | Facility: HOSPITAL | Age: 35
Discharge: HOME | End: 2025-07-09
Attending: INTERNAL MEDICINE
Payer: MEDICAID

## 2025-07-09 DIAGNOSIS — K59.00 COLONIC CONSTIPATION: Primary | ICD-10-CM

## 2025-07-09 DIAGNOSIS — K59.00 COLONIC CONSTIPATION: ICD-10-CM

## 2025-07-09 PROCEDURE — 74018 RADEX ABDOMEN 1 VIEW: CPT | Mod: TC

## 2025-07-11 ENCOUNTER — HOSPITAL ENCOUNTER (OUTPATIENT)
Dept: RADIOLOGY | Facility: HOSPITAL | Age: 35
Discharge: HOME | End: 2025-07-11
Attending: INTERNAL MEDICINE
Payer: MEDICAID

## 2025-07-11 DIAGNOSIS — K59.00 COLONIC CONSTIPATION: ICD-10-CM

## 2025-07-11 PROCEDURE — 74018 RADEX ABDOMEN 1 VIEW: CPT | Mod: TC

## 2025-08-11 DIAGNOSIS — K59.09 CHRONIC CONSTIPATION: Primary | ICD-10-CM

## (undated) DEVICE — SUPPORT ULNA NERVE PROTECTOR

## (undated) DEVICE — TOURNIQUET SB QC DP 44X4IN

## (undated) DEVICE — TOURNIQUET SB QC DP 34X4IN

## (undated) DEVICE — KIT POSITIONING KNEE

## (undated) DEVICE — GLOVE PROTEXIS HYDROGEL SZ7

## (undated) DEVICE — SUT VICRYL RAPIDE 4-0 18 P

## (undated) DEVICE — TUBE SET INFLOW/OUTFLOW

## (undated) DEVICE — CUFF ATS 2 PORT SNGL BLDR 34IN

## (undated) DEVICE — GLOVE PROTEXIS NEOPRN BRN SZ7

## (undated) DEVICE — SUT 3-0 MONOCRYL PLUS PS-2

## (undated) DEVICE — KIT SURGICAL TURNOVER

## (undated) DEVICE — ELECTRODE PATIENT RETURN DISP

## (undated) DEVICE — GLOVE PROTEXIS BLUE LATEX 7.5

## (undated) DEVICE — NDL SAFETY 21G X 1 IN ECLIPSE

## (undated) DEVICE — GLOVE SIGNATURE MICRO LTX 7.5

## (undated) DEVICE — GOWN POLY REINF X-LONG 2XL

## (undated) DEVICE — GLOVE PROTEXIS HYDROGEL SZ7.5

## (undated) DEVICE — GOWN POLY REINF BRTH SLV XL

## (undated) DEVICE — MANIFOLD 4 PORT

## (undated) DEVICE — SOL NACL IRR 3000ML

## (undated) DEVICE — COVER MAYO STAND REINFRCD 30

## (undated) DEVICE — GLOVE PROTEXIS HYDROGEL SZ8

## (undated) DEVICE — PACK SURGICAL KNEE SCOPE

## (undated) DEVICE — GLOVE SENSICARE PI GRN 7.5

## (undated) DEVICE — PENCIL ELECSURG ROCKER 15FT

## (undated) DEVICE — TRAY SKIN SCRUB WET PREMIUM

## (undated) DEVICE — DRAPE FULL SHEET 70X100IN

## (undated) DEVICE — CONTAINER SPECIMEN OR STER 4OZ

## (undated) DEVICE — PAD PREP CUFFED NS 24X48IN

## (undated) DEVICE — GAUZE SPONGE 4X4 12PLY

## (undated) DEVICE — HANDLE DEVON RIGID OR LIGHT

## (undated) DEVICE — PROBE ARTHO ENERGY 90 DEG

## (undated) DEVICE — GLOVE PROTEXIS LTX MICRO 8

## (undated) DEVICE — TUBING MEDI-VAC 20FT .25IN

## (undated) DEVICE — YANKAUER FLEX HAND FINE

## (undated) DEVICE — CLOSURE SKIN STERI STRIP 1/2X4

## (undated) DEVICE — POSITIONER HEAD ADULT

## (undated) DEVICE — BLADE SHAVER LANZA 4.2X13CM

## (undated) DEVICE — GAUZE XEROFORM NONADH 5X9IN

## (undated) DEVICE — COVER SHOE LG KNEE HI WTRPRF

## (undated) DEVICE — GLOVE SIGNATURE MICRO LTX 8

## (undated) DEVICE — SUT 3-0 ETHILON 18 FS-1

## (undated) DEVICE — BLADE GREAT WHITE 4.2 6/BX

## (undated) DEVICE — GLOVE SIGNATURE MICRO LTX 7

## (undated) DEVICE — GOWN X-LG STERILE BACK

## (undated) DEVICE — GLOVE PROTEXIS LTX MICRO  7.5

## (undated) DEVICE — GLOVE PROTEXIS BLUE LATEX 8